# Patient Record
Sex: FEMALE | Race: OTHER | HISPANIC OR LATINO | URBAN - METROPOLITAN AREA
[De-identification: names, ages, dates, MRNs, and addresses within clinical notes are randomized per-mention and may not be internally consistent; named-entity substitution may affect disease eponyms.]

---

## 2019-08-23 ENCOUNTER — EMERGENCY (EMERGENCY)
Facility: HOSPITAL | Age: 29
LOS: 0 days | Discharge: HOME | End: 2019-08-23
Admitting: EMERGENCY MEDICINE
Payer: MEDICAID

## 2019-08-23 VITALS
TEMPERATURE: 98 F | HEART RATE: 90 BPM | RESPIRATION RATE: 18 BRPM | SYSTOLIC BLOOD PRESSURE: 145 MMHG | DIASTOLIC BLOOD PRESSURE: 67 MMHG | OXYGEN SATURATION: 100 %

## 2019-08-23 DIAGNOSIS — K08.89 OTHER SPECIFIED DISORDERS OF TEETH AND SUPPORTING STRUCTURES: ICD-10-CM

## 2019-08-23 PROCEDURE — 99283 EMERGENCY DEPT VISIT LOW MDM: CPT

## 2019-08-23 RX ORDER — IBUPROFEN 200 MG
600 TABLET ORAL ONCE
Refills: 0 | Status: COMPLETED | OUTPATIENT
Start: 2019-08-23 | End: 2019-08-23

## 2019-08-23 RX ADMIN — Medication 600 MILLIGRAM(S): at 13:57

## 2019-08-23 NOTE — ED PROVIDER NOTE - OBJECTIVE STATEMENT
29 year old female with no pmxh presents with dental pain to left side. pt admits went to dentist, and was told she needed an extraction. no swelling or fever.

## 2019-08-23 NOTE — ED PROVIDER NOTE - NS ED ROS FT
Review of Systems:  	•	CONSTITUTIONAL - no fever, no diaphoresis, no chills  	•	SKIN - no rash  	•	EYES - no eye pain, no blurry vision  	•	ENT - no change in hearing, no sore throat, no ear pain or tinnitus

## 2019-08-23 NOTE — ED PROVIDER NOTE - PHYSICAL EXAMINATION
CONST: Well appearing in NAD  EYES: PERRL, EOMI, Sclera and conjunctiva clear.   ENT: + tenderness to tooth 19 with avulsion, No nasal discharge. TM's clear B/L without drainage. Oropharynx normal appearing, no erythema or exudates. No abscess or swelling. Uvula midline.  SKIN: Warm, dry, no acute rashes. Good turgor

## 2019-08-23 NOTE — CONSULT NOTE ADULT - ASSESSMENT
*ASSESSMENT: #18 is recommended for extraction. #30 needs to be extracted as well.     Patient requested to have #18 extracted today and will follow up with her dentist/oral surgeon to extract #30.     1. Reviewed Medical History: No changes   2. Premedication: None  3. Treatment:  - Risks and benefits discussed as per oral surgey sheet dated 7/13/2000. Consent and side site were signed. Administered 1 carpule 3% Mepivacaine via left inferior alveolar nerve block. 1 carpule of 4% Septocaine, 1:100,000 epinephrine via buccal and lingual infiltration.   - Simple extraction number #18 completed, without complications.  Hemostasis obtained.   - Post op radiograph taken. Findings were negative.   4. No complications  5. Next visit: follow-up with dentist or [facility] for extraction of #30      RECOMMENDATIONS:  1) Follow Post-Op instruction sheet  2) Dental F/U with outpatient dentist for comprehensive dental care and extraction of #30.   3) If any difficulty swallowing/breathing, fever occur, return to ER.     Jorge Kaur

## 2019-08-23 NOTE — CONSULT NOTE ADULT - SUBJECTIVE AND OBJECTIVE BOX
Patient is a 29y old  Female who presents with a chief complaint of dental pain on lower left and right5    HPI: Tooth #18 and 30 have been decayed and fractured over 6 months ago. Patient is seeing an outside dentist who recommended she see an oral surgeon to have them extracted. She went to urgent care yesterday and was placed on Clindamycin and given Naproxen for pain. Patient reports she has not started taking either       PAST MEDICAL & SURGICAL HISTORY:  No pertinent past medical history    ( -  ) heart valve replacement  ( -  ) joint replacement  ( -  ) pregnancy    MEDICATIONS  (STANDING): None reported  MEDICATIONS  (PRN): None reported  Allergies: No Known Allergies    Intolerances    FAMILY HISTORY: None reported      *SOCIAL HISTORY: ( -  ) Tobacco; ( -  ) ETOH    *Last Dental Visit: 2 months ago with general dentist    Vital Signs Last 24 Hrs  T(C): 36.7 (23 Aug 2019 13:32), Max: 36.7 (23 Aug 2019 13:32)  T(F): 98.1 (23 Aug 2019 13:32), Max: 98.1 (23 Aug 2019 13:32)  HR: 90 (23 Aug 2019 13:32) (90 - 90)  BP: 145/67 (23 Aug 2019 13:32) (145/67 - 145/67)  BP(mean): --  RR: 18 (23 Aug 2019 13:32) (18 - 18)  SpO2: 100% (23 Aug 2019 13:32) (100% - 100%)    LABS:    EOE:  No swelling, asymmetry or lymphadenopathy    IOE:  buccal swelling noted in vestibule adjacent to #18. #18 and 30 are grossly decayed.    Radiographs: Both #18 and #30 have periapical radiolucencies. #17 is bony impacted distal to #18    RADIOLOGY & ADDITIONAL STUDIES:

## 2019-08-29 ENCOUNTER — EMERGENCY (EMERGENCY)
Facility: HOSPITAL | Age: 29
LOS: 0 days | Discharge: HOME | End: 2019-08-29
Admitting: EMERGENCY MEDICINE
Payer: MEDICAID

## 2019-08-29 VITALS
TEMPERATURE: 98 F | DIASTOLIC BLOOD PRESSURE: 64 MMHG | RESPIRATION RATE: 18 BRPM | HEART RATE: 82 BPM | OXYGEN SATURATION: 98 % | SYSTOLIC BLOOD PRESSURE: 139 MMHG

## 2019-08-29 DIAGNOSIS — K08.89 OTHER SPECIFIED DISORDERS OF TEETH AND SUPPORTING STRUCTURES: ICD-10-CM

## 2019-08-29 PROCEDURE — 99282 EMERGENCY DEPT VISIT SF MDM: CPT

## 2019-08-29 NOTE — ED ADULT TRIAGE NOTE - CHIEF COMPLAINT QUOTE
pt c/o right sided dental pain x months. Seen dentist on Monday and was told to come back if worsening pain.

## 2019-08-29 NOTE — ED PROVIDER NOTE - ENMT, MLM
Airway patent, Nasal mucosa clear. Mouth with normal mucosa. Throat has no vesicles, no oropharyngeal exudates and uvula is midline.  + tenderness along tooth # 30 without surrounding gum fluctuance

## 2019-08-29 NOTE — ED PROVIDER NOTE - OBJECTIVE STATEMENT
30 y/o F, no significant PMHx, presents to the ED with complaints of dentalgia x five days. Patient was recently evaluated in the dental clinic on 8/23 and was dx with dental fx's of teeth 18 & 30. She is s/p extraction of tooth # 18 and was told to return for extraction of tooth # 30 if pain persists. She has been taking Clindamycin as prescribed; denies associated facial swelling, odynophagia, dysphagia, fever and chills.

## 2019-08-29 NOTE — CONSULT NOTE ADULT - SUBJECTIVE AND OBJECTIVE BOX
S: 30 yo female patient presented to clinic with lower right pain.     O: No swelling observed. No fever seen.     A: Caries to the pulp on tooth #30. Discussed that patient could try to save the tooth and have a root canal done on #30 or extract the tooth. The patient did not want to go through with root canal and elected to have #30 extracted.     P: Explained risks and benefits of procedure as per OS sheet dated 07/13/2000. Consent obtained and side site completed. Anesthetized using 2 carpules 4% septocaine with 1:100,000 epinephrine and 1 carpule lidocaine with 1:100,000 epinephrine via inferior alveolar block and buccal infiltration. Elevated using elevators and delivered with cowhorn forceps. Hemostasis acquired with pressure. Curetted area. Post-op radiograph taken. Post-op instructions given.     Patient was given Clindamycin 325 mg by urgent care physician about a week ago so instructed the patient to finish the course of antibiotics. Patient was also prescribed Naproxen for pain management so instructed the patient to use Naproxen as needed for pain after the procedure. If the patient has post-op infection or dry socket, instructed the patient to return.     Fatou Hernandez DDS, [id number] (spectra number) S: 30 yo female patient presented to clinic with lower right pain.     O: No swelling observed. No fever seen.     A: Caries to the pulp on tooth #30. Discussed that patient could try to save the tooth and have a root canal done on #30 or extract the tooth. The patient did not want to go through with root canal and elected to have #30 extracted.     P: Explained risks and benefits of procedure as per OS sheet dated 07/13/2000. Consent obtained and side site completed. Anesthetized using 2 carpules 4% septocaine with 1:100,000 epinephrine and 1 carpule lidocaine with 1:100,000 epinephrine via inferior alveolar block and buccal infiltration. Elevated using elevators and delivered with cowhorn forceps. Hemostasis acquired with pressure. Curetted area. Post-op radiograph taken. Post-op instructions given.     Patient was given Clindamycin 300 mg by urgent care physician about a week ago so instructed the patient to finish the course of antibiotics. Patient was also prescribed Naproxen for pain management so instructed the patient to use Naproxen as needed for pain after the procedure. If the patient has post-op infection or dry socket, instructed the patient to return.     Fatou Hernandez DDS, [phone] (spectra number)

## 2020-06-09 NOTE — ED ADULT TRIAGE NOTE - SPO2 (%)
Patient missed appointment last week.     Spoke with patient informed her she has an appointment tomorrow, and will have to wait until then because she is over due for her follow up. Patient verbalized understanding and stated she will be in tomorrow.  
Patient will see Dr Teran tomorrow. She is out of medication- asking this be filled today? Please advise.     Medication: Diazepam    Provider: Sunday Teran MD  Preferred Contact Number: [phone] (mobile)      [facility]    Patient instructed to call pharmacy directly for future refills.      Advised patient that the nurse will call if there are questions or concerns, otherwise refill processing may take 48-72 hours.     
98

## 2022-04-25 ENCOUNTER — OUTPATIENT (OUTPATIENT)
Dept: OUTPATIENT SERVICES | Facility: HOSPITAL | Age: 32
LOS: 1 days | Discharge: HOME | End: 2022-04-25
Payer: MEDICAID

## 2022-04-25 VITALS — SYSTOLIC BLOOD PRESSURE: 128 MMHG | DIASTOLIC BLOOD PRESSURE: 72 MMHG | HEART RATE: 100 BPM | TEMPERATURE: 98 F

## 2022-04-25 PROCEDURE — 59025 FETAL NON-STRESS TEST: CPT | Mod: 26

## 2022-04-25 PROCEDURE — 76815 OB US LIMITED FETUS(S): CPT | Mod: 26

## 2022-04-25 PROCEDURE — 99215 OFFICE O/P EST HI 40 MIN: CPT | Mod: 25

## 2022-04-25 NOTE — OB PROVIDER TRIAGE NOTE - HISTORY OF PRESENT ILLNESS
33 yo  @39w0d, FREIDA 22 dated by first trimester sono, presents complaining of intermittent contractions of varying intensity that started yesterday night. Also reports some light pink spotting since yesterday night not requiring pads, now resolved. Denies leakage of fluid. Reports good fetal movement. Patient was 1cm dilated in the office last week. Gets prenatal care in [place], wishes to transfer care to [facility]. Denies complications this pregnancy. GBS negative. 31 yo  at 39w0d GA, FREIDA 22 dated by first trimester sono, presents complaining of intermittent contractions of varying intensity that started yesterday night. Also reports some light pink spotting since yesterday night not requiring pads, now resolved. Denies leakage of fluid. Reports good fetal movement. GBS negative.   Recieves prenatal care in [place], wishes to transfer care to [facility].   Denies complications this pregnancy.

## 2022-04-25 NOTE — OB RN TRIAGE NOTE - FALL HARM RISK - UNIVERSAL INTERVENTIONS
Bed in lowest position, wheels locked, appropriate side rails in place/Call bell, personal items and telephone in reach/Instruct patient to call for assistance before getting out of bed or chair/Non-slip footwear when patient is out of bed/Jal to call system/Physically safe environment - no spills, clutter or unnecessary equipment/Purposeful Proactive Rounding/Room/bathroom lighting operational, light cord in reach

## 2022-04-25 NOTE — OB PROVIDER TRIAGE NOTE - NSHPLABSRESULTS_GEN_ALL_CORE
GBS neg, O pos per patient's chart on phone    no PNC available GBS neg, O pos per electronic medical record on patient's phone    no PNC available

## 2022-04-25 NOTE — OB PROVIDER TRIAGE NOTE - NSOBPROVIDERNOTE_OBGYN_ALL_OB_FT
31 yo  @39w0d, GBS neg, with contractions, not in labor, BPP 10/10, with reassuring maternal and fetal status  -[facility] precautions reviewed  -Labor precautions given  -PO maternal hydration encouraged  -Will call patient tomorrow with appointment at [facility] to transfer care and discuss induction    Dr Ferrer and Dr Boudreaux aware A/P: 33 yo  at 39w0d GA, GBS neg, with contractions, not in labor, BPP 10/10, with reassuring maternal and fetal status  -discussed term labor precautions   -discussed fetal kick count instructions   -discussed PO maternal hydration   -Will call patient tomorrow with appointment at [facility] to transfer care    Dr Ferrer and Dr Boudreaux aware A/P: 31 yo  at 39w0d GA, GBS neg, with contractions, not in labor, BPP 10/10, with reassuring maternal and fetal status  -discussed term labor precautions   -discussed fetal kick count instructions   -discussed PO maternal hydration   -Will call patient tomorrow with appointment at [facility] to transfer care    Dr Ferrer and Dr Boudreaux aware    I was physically present for the key portions of the evaluation and management (e/m) service provided. I agree with the above history,physical and plan which I have reviewed and edited where appropriate    nst reactive  sonogram mvp >2cm    Pt presented to triage and was evaluated starting 16:49. The fetal heart rate monitor ended 17:50  I spent 60 minutes caring for the patient. It included obtaining a history, performing an examination, continuously monitoring the fetus and interpretation of the fetal heart rate strip, ordering and reviewing labs, documenting in the medical record and a discussion with the patient .

## 2022-04-25 NOTE — OB PROVIDER TRIAGE NOTE - NSHPPHYSICALEXAM_GEN_ALL_CORE
Physical exam:    Vital Signs Last 24 Hrs  T(F): 98.1 (25 Apr 2022 17:01), Max: 98.1 (25 Apr 2022 16:57)  HR: 100 (25 Apr 2022 17:01) (100 - 100)  BP: 128/72 (25 Apr 2022 17:01) (128/72 - 128/72)  RR: 18 (25 Apr 2022 17:01) (18 - 18)    Gen: NAD  Abdomen: soft, gravid, nontender, with nonpalpable contractions    EFM: 140/mod/pos acc  toco: irregular  SVE: 1/0/-3, vtx, intact  Sono: vertex, anterior placenta, MVP 6.2cm, BPP 8/8, EFW 3640g (68%) Vital Signs Last 24 Hrs  T(F): 98.1 (25 Apr 2022 17:01), Max: 98.1 (25 Apr 2022 16:57)  HR: 100 (25 Apr 2022 17:01) (100 - 100)  BP: 128/72 (25 Apr 2022 17:01) (128/72 - 128/72)  RR: 18 (25 Apr 2022 17:01) (18 - 18)    Physical exam:  Gen: NAD  Abdomen: soft, gravid, nontender, with nonpalpable contractions    EFM: 140/mod/pos acc  Farina: irregular  SVE: 1/0/-3, vtx, intact  Sono: vertex, anterior placenta, MVP 6.2cm, BPP 8/8, EFW 3640g (68%)

## 2022-04-26 PROBLEM — Z00.00 ENCOUNTER FOR PREVENTIVE HEALTH EXAMINATION: Status: ACTIVE | Noted: 2022-04-26

## 2022-04-28 ENCOUNTER — APPOINTMENT (OUTPATIENT)
Dept: OBGYN | Facility: CLINIC | Age: 32
End: 2022-04-28
Payer: MEDICAID

## 2022-04-28 ENCOUNTER — RESULT CHARGE (OUTPATIENT)
Age: 32
End: 2022-04-28

## 2022-04-28 ENCOUNTER — NON-APPOINTMENT (OUTPATIENT)
Age: 32
End: 2022-04-28

## 2022-04-28 ENCOUNTER — OUTPATIENT (OUTPATIENT)
Dept: OUTPATIENT SERVICES | Facility: HOSPITAL | Age: 32
LOS: 1 days | Discharge: HOME | End: 2022-04-28

## 2022-04-28 VITALS
SYSTOLIC BLOOD PRESSURE: 122 MMHG | HEIGHT: 62 IN | BODY MASS INDEX: 35.53 KG/M2 | WEIGHT: 193.06 LBS | DIASTOLIC BLOOD PRESSURE: 80 MMHG

## 2022-04-28 DIAGNOSIS — Z34.90 ENCOUNTER FOR SUPERVISION OF NORMAL PREGNANCY, UNSPECIFIED, UNSPECIFIED TRIMESTER: ICD-10-CM

## 2022-04-28 LAB
BILIRUB UR QL STRIP: NEGATIVE
CLARITY UR: CLEAR
COLLECTION METHOD: NORMAL
GLUCOSE UR-MCNC: NEGATIVE
HCG UR QL: NEGATIVE EU/DL
HGB UR QL STRIP.AUTO: NEGATIVE
KETONES UR-MCNC: NEGATIVE
LEUKOCYTE ESTERASE UR QL STRIP: NEGATIVE
NITRITE UR QL STRIP: NEGATIVE
PH UR STRIP: 7
PROT UR STRIP-MCNC: NEGATIVE
SP GR UR STRIP: 1.01

## 2022-04-28 PROCEDURE — 99214 OFFICE O/P EST MOD 30 MIN: CPT

## 2022-04-29 DIAGNOSIS — Z34.90 ENCOUNTER FOR SUPERVISION OF NORMAL PREGNANCY, UNSPECIFIED, UNSPECIFIED TRIMESTER: ICD-10-CM

## 2022-05-01 ENCOUNTER — INPATIENT (INPATIENT)
Facility: HOSPITAL | Age: 32
LOS: 1 days | Discharge: HOME | End: 2022-05-03
Attending: STUDENT IN AN ORGANIZED HEALTH CARE EDUCATION/TRAINING PROGRAM | Admitting: STUDENT IN AN ORGANIZED HEALTH CARE EDUCATION/TRAINING PROGRAM
Payer: MEDICAID

## 2022-05-01 VITALS
WEIGHT: 190.04 LBS | DIASTOLIC BLOOD PRESSURE: 74 MMHG | HEIGHT: 62 IN | SYSTOLIC BLOOD PRESSURE: 126 MMHG | SYSTOLIC BLOOD PRESSURE: 126 MMHG | HEART RATE: 93 BPM | DIASTOLIC BLOOD PRESSURE: 74 MMHG | RESPIRATION RATE: 17 BRPM | TEMPERATURE: 98 F | HEART RATE: 93 BPM

## 2022-05-01 LAB
APPEARANCE UR: CLEAR — SIGNIFICANT CHANGE UP
BASOPHILS # BLD AUTO: 0.07 K/UL — SIGNIFICANT CHANGE UP (ref 0–0.2)
BASOPHILS NFR BLD AUTO: 0.9 % — SIGNIFICANT CHANGE UP (ref 0–1)
BILIRUB UR-MCNC: NEGATIVE — SIGNIFICANT CHANGE UP
BLD GP AB SCN SERPL QL: SIGNIFICANT CHANGE UP
COLOR SPEC: YELLOW — SIGNIFICANT CHANGE UP
DIFF PNL FLD: NEGATIVE — SIGNIFICANT CHANGE UP
EOSINOPHIL # BLD AUTO: 0.13 K/UL — SIGNIFICANT CHANGE UP (ref 0–0.7)
EOSINOPHIL NFR BLD AUTO: 1.8 % — SIGNIFICANT CHANGE UP (ref 0–8)
GIANT PLATELETS BLD QL SMEAR: PRESENT — SIGNIFICANT CHANGE UP
GLUCOSE UR QL: NEGATIVE — SIGNIFICANT CHANGE UP
HCT VFR BLD CALC: 34.2 % — LOW (ref 37–47)
HGB BLD-MCNC: 11.6 G/DL — LOW (ref 12–16)
KETONES UR-MCNC: NEGATIVE — SIGNIFICANT CHANGE UP
LEUKOCYTE ESTERASE UR-ACNC: NEGATIVE — SIGNIFICANT CHANGE UP
LYMPHOCYTES # BLD AUTO: 1.27 K/UL — SIGNIFICANT CHANGE UP (ref 1.2–3.4)
LYMPHOCYTES # BLD AUTO: 17.1 % — LOW (ref 20.5–51.1)
MACROCYTES BLD QL: SLIGHT — SIGNIFICANT CHANGE UP
MANUAL SMEAR VERIFICATION: SIGNIFICANT CHANGE UP
MCHC RBC-ENTMCNC: 30.9 PG — SIGNIFICANT CHANGE UP (ref 27–31)
MCHC RBC-ENTMCNC: 33.9 G/DL — SIGNIFICANT CHANGE UP (ref 32–37)
MCV RBC AUTO: 91 FL — SIGNIFICANT CHANGE UP (ref 81–99)
MONOCYTES # BLD AUTO: 0.27 K/UL — SIGNIFICANT CHANGE UP (ref 0.1–0.6)
MONOCYTES NFR BLD AUTO: 3.6 % — SIGNIFICANT CHANGE UP (ref 1.7–9.3)
MYELOCYTES NFR BLD: 2.7 % — HIGH (ref 0–0)
NEUTROPHILS # BLD AUTO: 5.41 K/UL — SIGNIFICANT CHANGE UP (ref 1.4–6.5)
NEUTROPHILS NFR BLD AUTO: 72.1 % — SIGNIFICANT CHANGE UP (ref 42.2–75.2)
NEUTS BAND # BLD: 0.9 % — SIGNIFICANT CHANGE UP (ref 0–6)
NITRITE UR-MCNC: NEGATIVE — SIGNIFICANT CHANGE UP
PH UR: 6.5 — SIGNIFICANT CHANGE UP (ref 5–8)
PLAT MORPH BLD: NORMAL — SIGNIFICANT CHANGE UP
PLATELET # BLD AUTO: 173 K/UL — SIGNIFICANT CHANGE UP (ref 130–400)
POLYCHROMASIA BLD QL SMEAR: SLIGHT — SIGNIFICANT CHANGE UP
PRENATAL SYPHILIS TEST: SIGNIFICANT CHANGE UP
PROT UR-MCNC: SIGNIFICANT CHANGE UP
RBC # BLD: 3.76 M/UL — LOW (ref 4.2–5.4)
RBC # FLD: 12.6 % — SIGNIFICANT CHANGE UP (ref 11.5–14.5)
RBC BLD AUTO: ABNORMAL
SARS-COV-2 RNA SPEC QL NAA+PROBE: SIGNIFICANT CHANGE UP
SMUDGE CELLS # BLD: PRESENT — SIGNIFICANT CHANGE UP
SP GR SPEC: 1.02 — SIGNIFICANT CHANGE UP (ref 1.01–1.03)
UROBILINOGEN FLD QL: SIGNIFICANT CHANGE UP
VARIANT LYMPHS # BLD: 0.9 % — SIGNIFICANT CHANGE UP (ref 0–5)
WBC # BLD: 7.41 K/UL — SIGNIFICANT CHANGE UP (ref 4.8–10.8)
WBC # FLD AUTO: 7.41 K/UL — SIGNIFICANT CHANGE UP (ref 4.8–10.8)

## 2022-05-01 RX ORDER — OXYTOCIN 10 UNIT/ML
333.33 VIAL (ML) INJECTION
Qty: 20 | Refills: 0 | Status: DISCONTINUED | OUTPATIENT
Start: 2022-05-01 | End: 2022-05-02

## 2022-05-01 RX ORDER — OXYTOCIN 10 UNIT/ML
2 VIAL (ML) INJECTION
Qty: 30 | Refills: 0 | Status: DISCONTINUED | OUTPATIENT
Start: 2022-05-01 | End: 2022-05-02

## 2022-05-01 RX ORDER — SODIUM CHLORIDE 9 MG/ML
1000 INJECTION, SOLUTION INTRAVENOUS
Refills: 0 | Status: DISCONTINUED | OUTPATIENT
Start: 2022-05-01 | End: 2022-05-02

## 2022-05-01 NOTE — OB RN PATIENT PROFILE - NS_OBGYNHISTORY_OBGYN_ALL_OB_FT
Speaking Coherently
OB Hx: . FT NSVDx3, largest 7-0  GYN hx: Denies fibroids, cysts, abnormal pap smears, STIs

## 2022-05-01 NOTE — OB PROVIDER H&P - HISTORY OF PRESENT ILLNESS
33yo  at 39w6d dated by LMP presenting for scheduled IOL at term. Reports irregular contractions, denies LOF or VB. Good FM. Denies complications this pregnancy. Late transfer to care from [place]. Last baby >10yrs ago. Last checked in office 22 and found to be 2/50/-3. GBS neg (patient has records, GBS done 22)

## 2022-05-01 NOTE — OB PROVIDER H&P - PRO ANTIBODY SCREEN
Guide to Postpartum Care/Letter of Medical Neccessity/Birth Certificate Instructions/Vaccinations/[facility] Hearing Screen Program/[place]  Immunization Record/Breastfeeding Log/Back To Sleep Handout/Shaken Baby Prevention Handout/Breastfeeding Guide and Packet/[facility] [place] Screening Program negative

## 2022-05-01 NOTE — OB PROVIDER H&P - NS_OBGYNHISTORY_OBGYN_ALL_OB_FT
OB: FT NSVDx3 largest 7lb~oz  GYB; denies history of fibroids, ovarian cysts, abnormal papsmears, and STIs

## 2022-05-01 NOTE — OB PROVIDER H&P - ASSESSMENT
32y  at 39w6d, GBS neg, IOL at term.  -Admit to L+D  -Monitor EFM and TOCO   -IVF and labs  -Pain control PRN  -Clear liquid diet as tolerated  -Pitocin for labor induction    Dr. Lopez and Dr. Cuellar

## 2022-05-01 NOTE — OB PROVIDER H&P - NSHPPHYSICALEXAM_GEN_ALL_CORE
T(C): 36.6 (05-01-22 @ 19:43), Max: 36.6 (05-01-22 @ 19:43)  HR: 93 (05-01-22 @ 19:43) (93 - 93)  BP: 126/74 (05-01-22 @ 19:43) (126/74 - 126/74)  RR: 17 (05-01-22 @ 19:43) (17 - 17)  SpO2: --  BMI (kg/m2): 34.7 (05-01-22 @ 19:43)    Gen: A+OX3. NAD  Abd: Soft, Nontender. Gravid. No palpable contractions  SVE: 2/50/-3    FHR:  145BPM/mod ludin/accels pos  TOCO: irregular      EFW by Le[place]: 3600

## 2022-05-01 NOTE — OB RN PATIENT PROFILE - FALL HARM RISK - UNIVERSAL INTERVENTIONS
Bed in lowest position, wheels locked, appropriate side rails in place/Call bell, personal items and telephone in reach/Instruct patient to call for assistance before getting out of bed or chair/Non-slip footwear when patient is out of bed/[place] to call system/Physically safe environment - no spills, clutter or unnecessary equipment/Purposeful Proactive Rounding/Room/bathroom lighting operational, light cord in reach

## 2022-05-02 LAB
ABO + RH PNL BLD: NORMAL
AMPHET UR-MCNC: NEGATIVE — SIGNIFICANT CHANGE UP
BARBITURATES UR SCN-MCNC: NEGATIVE — SIGNIFICANT CHANGE UP
BASOPHILS # BLD AUTO: 0.02 K/UL — SIGNIFICANT CHANGE UP (ref 0–0.2)
BASOPHILS # BLD AUTO: 0.05 K/UL
BASOPHILS NFR BLD AUTO: 0.2 % — SIGNIFICANT CHANGE UP (ref 0–1)
BASOPHILS NFR BLD AUTO: 0.7 %
BENZODIAZ UR-MCNC: NEGATIVE — SIGNIFICANT CHANGE UP
BLD GP AB SCN SERPL QL: NORMAL
BUPRENORPHINE SCREEN, URINE RESULT: NEGATIVE — SIGNIFICANT CHANGE UP
COCAINE METAB.OTHER UR-MCNC: NEGATIVE — SIGNIFICANT CHANGE UP
EOSINOPHIL # BLD AUTO: 0.08 K/UL — SIGNIFICANT CHANGE UP (ref 0–0.7)
EOSINOPHIL # BLD AUTO: 0.1 K/UL
EOSINOPHIL NFR BLD AUTO: 0.8 % — SIGNIFICANT CHANGE UP (ref 0–8)
EOSINOPHIL NFR BLD AUTO: 1.3 %
FENTANYL UR QL: NEGATIVE — SIGNIFICANT CHANGE UP
HBV SURFACE AG SER QL: NONREACTIVE
HCT VFR BLD CALC: 30.3 % — LOW (ref 37–47)
HCT VFR BLD CALC: 36.6 %
HCV AB SER QL: NONREACTIVE
HCV S/CO RATIO: 0.08 S/CO
HGB BLD-MCNC: 10.2 G/DL — LOW (ref 12–16)
HGB BLD-MCNC: 11.7 G/DL
HIV1+2 AB SPEC QL IA.RAPID: NONREACTIVE
IMM GRANULOCYTES NFR BLD AUTO: 1.1 % — HIGH (ref 0.1–0.3)
IMM GRANULOCYTES NFR BLD AUTO: 2 %
L&D DRUG SCREEN, URINE: SIGNIFICANT CHANGE UP
LYMPHOCYTES # BLD AUTO: 1.64 K/UL — SIGNIFICANT CHANGE UP (ref 1.2–3.4)
LYMPHOCYTES # BLD AUTO: 1.75 K/UL
LYMPHOCYTES # BLD AUTO: 16.2 % — LOW (ref 20.5–51.1)
LYMPHOCYTES NFR BLD AUTO: 23.5 %
MAN DIFF?: NORMAL
MCHC RBC-ENTMCNC: 29.5 PG
MCHC RBC-ENTMCNC: 30.9 PG — SIGNIFICANT CHANGE UP (ref 27–31)
MCHC RBC-ENTMCNC: 32 G/DL
MCHC RBC-ENTMCNC: 33.7 G/DL — SIGNIFICANT CHANGE UP (ref 32–37)
MCV RBC AUTO: 91.8 FL — SIGNIFICANT CHANGE UP (ref 81–99)
MCV RBC AUTO: 92.4 FL
METHADONE UR-MCNC: NEGATIVE — SIGNIFICANT CHANGE UP
MONOCYTES # BLD AUTO: 0.59 K/UL
MONOCYTES # BLD AUTO: 0.64 K/UL — HIGH (ref 0.1–0.6)
MONOCYTES NFR BLD AUTO: 6.3 % — SIGNIFICANT CHANGE UP (ref 1.7–9.3)
MONOCYTES NFR BLD AUTO: 7.9 %
NEUTROPHILS # BLD AUTO: 4.81 K/UL
NEUTROPHILS # BLD AUTO: 7.63 K/UL — HIGH (ref 1.4–6.5)
NEUTROPHILS NFR BLD AUTO: 64.6 %
NEUTROPHILS NFR BLD AUTO: 75.4 % — HIGH (ref 42.2–75.2)
NRBC # BLD: 0 /100 WBCS — SIGNIFICANT CHANGE UP (ref 0–0)
OPIATES UR-MCNC: NEGATIVE — SIGNIFICANT CHANGE UP
OXYCODONE UR-MCNC: NEGATIVE — SIGNIFICANT CHANGE UP
PCP UR-MCNC: NEGATIVE — SIGNIFICANT CHANGE UP
PLATELET # BLD AUTO: 140 K/UL — SIGNIFICANT CHANGE UP (ref 130–400)
PLATELET # BLD AUTO: 172 K/UL
PROPOXYPHENE QUALITATIVE URINE RESULT: NEGATIVE — SIGNIFICANT CHANGE UP
RBC # BLD: 3.3 M/UL — LOW (ref 4.2–5.4)
RBC # BLD: 3.96 M/UL
RBC # FLD: 12.4 %
RBC # FLD: 12.7 % — SIGNIFICANT CHANGE UP (ref 11.5–14.5)
RUBV IGG FLD-ACNC: 4.7 INDEX
RUBV IGG SER-IMP: POSITIVE
T PALLIDUM AB SER QL IA: NEGATIVE
VZV AB TITR SER: POSITIVE
VZV IGG SER IF-ACNC: 187.1 INDEX
WBC # BLD: 10.12 K/UL — SIGNIFICANT CHANGE UP (ref 4.8–10.8)
WBC # FLD AUTO: 10.12 K/UL — SIGNIFICANT CHANGE UP (ref 4.8–10.8)
WBC # FLD AUTO: 7.45 K/UL

## 2022-05-02 PROCEDURE — 59409 OBSTETRICAL CARE: CPT | Mod: U9

## 2022-05-02 RX ORDER — SODIUM CHLORIDE 9 MG/ML
3 INJECTION INTRAMUSCULAR; INTRAVENOUS; SUBCUTANEOUS EVERY 8 HOURS
Refills: 0 | Status: DISCONTINUED | OUTPATIENT
Start: 2022-05-02 | End: 2022-05-03

## 2022-05-02 RX ORDER — ONDANSETRON 8 MG/1
4 TABLET, FILM COATED ORAL EVERY 6 HOURS
Refills: 0 | Status: DISCONTINUED | OUTPATIENT
Start: 2022-05-02 | End: 2022-05-03

## 2022-05-02 RX ORDER — NALOXONE HYDROCHLORIDE 4 MG/.1ML
0.1 SPRAY NASAL
Refills: 0 | Status: DISCONTINUED | OUTPATIENT
Start: 2022-05-02 | End: 2022-05-03

## 2022-05-02 RX ORDER — PRAMOXINE HYDROCHLORIDE 150 MG/15G
1 AEROSOL, FOAM RECTAL EVERY 4 HOURS
Refills: 0 | Status: DISCONTINUED | OUTPATIENT
Start: 2022-05-02 | End: 2022-05-03

## 2022-05-02 RX ORDER — OXYCODONE HYDROCHLORIDE 5 MG/1
5 TABLET ORAL ONCE
Refills: 0 | Status: DISCONTINUED | OUTPATIENT
Start: 2022-05-02 | End: 2022-05-03

## 2022-05-02 RX ORDER — OXYTOCIN 10 UNIT/ML
333.33 VIAL (ML) INJECTION
Qty: 20 | Refills: 0 | Status: DISCONTINUED | OUTPATIENT
Start: 2022-05-02 | End: 2022-05-03

## 2022-05-02 RX ORDER — BENZOCAINE 10 %
1 GEL (GRAM) MUCOUS MEMBRANE EVERY 6 HOURS
Refills: 0 | Status: DISCONTINUED | OUTPATIENT
Start: 2022-05-02 | End: 2022-05-03

## 2022-05-02 RX ORDER — FENTANYL/BUPIVACAINE/NS/PF 2MCG/ML-.1
250 PLASTIC BAG, INJECTION (ML) INJECTION
Refills: 0 | Status: DISCONTINUED | OUTPATIENT
Start: 2022-05-02 | End: 2022-05-02

## 2022-05-02 RX ORDER — LANOLIN
1 OINTMENT (GRAM) TOPICAL EVERY 6 HOURS
Refills: 0 | Status: DISCONTINUED | OUTPATIENT
Start: 2022-05-02 | End: 2022-05-03

## 2022-05-02 RX ORDER — TETANUS TOXOID, REDUCED DIPHTHERIA TOXOID AND ACELLULAR PERTUSSIS VACCINE, ADSORBED 5; 2.5; 8; 8; 2.5 [IU]/.5ML; [IU]/.5ML; UG/.5ML; UG/.5ML; UG/.5ML
0.5 SUSPENSION INTRAMUSCULAR ONCE
Refills: 0 | Status: DISCONTINUED | OUTPATIENT
Start: 2022-05-02 | End: 2022-05-03

## 2022-05-02 RX ORDER — DIPHENHYDRAMINE HCL 50 MG
25 CAPSULE ORAL EVERY 6 HOURS
Refills: 0 | Status: DISCONTINUED | OUTPATIENT
Start: 2022-05-02 | End: 2022-05-03

## 2022-05-02 RX ORDER — SIMETHICONE 80 MG/1
80 TABLET, CHEWABLE ORAL EVERY 4 HOURS
Refills: 0 | Status: DISCONTINUED | OUTPATIENT
Start: 2022-05-02 | End: 2022-05-03

## 2022-05-02 RX ORDER — DIPHENHYDRAMINE HCL 50 MG
25 CAPSULE ORAL ONCE
Refills: 0 | Status: COMPLETED | OUTPATIENT
Start: 2022-05-02 | End: 2022-05-02

## 2022-05-02 RX ORDER — AER TRAVELER 0.5 G/1
1 SOLUTION RECTAL; TOPICAL EVERY 4 HOURS
Refills: 0 | Status: DISCONTINUED | OUTPATIENT
Start: 2022-05-02 | End: 2022-05-03

## 2022-05-02 RX ORDER — OXYCODONE HYDROCHLORIDE 5 MG/1
5 TABLET ORAL
Refills: 0 | Status: DISCONTINUED | OUTPATIENT
Start: 2022-05-02 | End: 2022-05-03

## 2022-05-02 RX ORDER — DIBUCAINE 1 %
1 OINTMENT (GRAM) RECTAL EVERY 6 HOURS
Refills: 0 | Status: DISCONTINUED | OUTPATIENT
Start: 2022-05-02 | End: 2022-05-03

## 2022-05-02 RX ORDER — IBUPROFEN 200 MG
600 TABLET ORAL EVERY 6 HOURS
Refills: 0 | Status: DISCONTINUED | OUTPATIENT
Start: 2022-05-02 | End: 2022-05-03

## 2022-05-02 RX ORDER — BUTORPHANOL TARTRATE 2 MG/ML
2 INJECTION, SOLUTION INTRAMUSCULAR; INTRAVENOUS ONCE
Refills: 0 | Status: DISCONTINUED | OUTPATIENT
Start: 2022-05-02 | End: 2022-05-02

## 2022-05-02 RX ORDER — HYDROCORTISONE 1 %
1 OINTMENT (GRAM) TOPICAL EVERY 6 HOURS
Refills: 0 | Status: DISCONTINUED | OUTPATIENT
Start: 2022-05-02 | End: 2022-05-03

## 2022-05-02 RX ORDER — KETOROLAC TROMETHAMINE 30 MG/ML
30 SYRINGE (ML) INJECTION ONCE
Refills: 0 | Status: DISCONTINUED | OUTPATIENT
Start: 2022-05-02 | End: 2022-05-02

## 2022-05-02 RX ORDER — MAGNESIUM HYDROXIDE 400 MG/1
30 TABLET, CHEWABLE ORAL
Refills: 0 | Status: DISCONTINUED | OUTPATIENT
Start: 2022-05-02 | End: 2022-05-03

## 2022-05-02 RX ORDER — ACETAMINOPHEN 500 MG
975 TABLET ORAL
Refills: 0 | Status: DISCONTINUED | OUTPATIENT
Start: 2022-05-02 | End: 2022-05-03

## 2022-05-02 RX ORDER — IBUPROFEN 200 MG
600 TABLET ORAL EVERY 6 HOURS
Refills: 0 | Status: COMPLETED | OUTPATIENT
Start: 2022-05-02 | End: 2023-03-31

## 2022-05-02 RX ORDER — DEXAMETHASONE 0.5 MG/5ML
4 ELIXIR ORAL EVERY 6 HOURS
Refills: 0 | Status: DISCONTINUED | OUTPATIENT
Start: 2022-05-02 | End: 2022-05-03

## 2022-05-02 RX ADMIN — Medication 600 MILLIGRAM(S): at 13:00

## 2022-05-02 RX ADMIN — Medication 975 MILLIGRAM(S): at 09:12

## 2022-05-02 RX ADMIN — Medication 0.2 MILLIGRAM(S): at 05:44

## 2022-05-02 RX ADMIN — Medication 1 TABLET(S): at 12:29

## 2022-05-02 RX ADMIN — Medication 975 MILLIGRAM(S): at 15:19

## 2022-05-02 RX ADMIN — Medication 975 MILLIGRAM(S): at 15:50

## 2022-05-02 RX ADMIN — Medication 975 MILLIGRAM(S): at 22:27

## 2022-05-02 RX ADMIN — SODIUM CHLORIDE 3 MILLILITER(S): 9 INJECTION INTRAMUSCULAR; INTRAVENOUS; SUBCUTANEOUS at 13:11

## 2022-05-02 RX ADMIN — Medication 25 MILLIGRAM(S): at 02:58

## 2022-05-02 RX ADMIN — Medication 600 MILLIGRAM(S): at 12:29

## 2022-05-02 RX ADMIN — BUTORPHANOL TARTRATE 2 MILLIGRAM(S): 2 INJECTION, SOLUTION INTRAMUSCULAR; INTRAVENOUS at 02:58

## 2022-05-02 RX ADMIN — Medication 975 MILLIGRAM(S): at 21:23

## 2022-05-02 RX ADMIN — Medication 600 MILLIGRAM(S): at 23:26

## 2022-05-02 RX ADMIN — SODIUM CHLORIDE 3 MILLILITER(S): 9 INJECTION INTRAMUSCULAR; INTRAVENOUS; SUBCUTANEOUS at 21:24

## 2022-05-02 RX ADMIN — Medication 975 MILLIGRAM(S): at 08:42

## 2022-05-02 RX ADMIN — Medication 600 MILLIGRAM(S): at 18:00

## 2022-05-02 RX ADMIN — BUTORPHANOL TARTRATE 2 MILLIGRAM(S): 2 INJECTION, SOLUTION INTRAMUSCULAR; INTRAVENOUS at 03:28

## 2022-05-02 RX ADMIN — SODIUM CHLORIDE 125 MILLILITER(S): 9 INJECTION, SOLUTION INTRAVENOUS at 02:53

## 2022-05-02 RX ADMIN — Medication 600 MILLIGRAM(S): at 17:30

## 2022-05-02 RX ADMIN — Medication 30 MILLIGRAM(S): at 06:30

## 2022-05-02 NOTE — PROCEDURE NOTE - ADDITIONAL PROCEDURE DETAILS
Pt started on epidural infusion of 0.0625% bupivacaine with 2 mcg/ml fentanyl at 10 ml/hr with PCEA settings of 5 ml demand dose, 15 minute lockout, 30 ml 4 hr limit.  's, maternal VSS Pt started on epidural infusion of 0.0625% bupivacaine with 2 mcg/ml fentanyl at 10 ml/hr with PCEA settings of 5 ml demand dose, 15 minute lockout, 30 ml 4 hr limit.  's, maternal VSS    Post Labor  Epidural/ Delivery  Evaluation Note:    Uncomplicated anesthetic for Vaginal Delivery.    Patient seen at bedside. Epidural to be removed by RN before patient transfer.  Patient moving B/L lower extremities.  Motor block appropriate and resolving. Vital Signs are stable. Pain well controlled.     Harry Score greater than 9    Mental Status:  __x__ Awake   ___x__ Alert   _____ Drowsy   _____ Sedated    Nausea/Vomiting:  __x__ NO  ______Yes,   See Post - Op Orders          Pain Scale (0-10):  _____    Treatment: __X__ None       Plan: Discharge:   ____Home       __X___Floor     _____Critical Care    _____

## 2022-05-02 NOTE — OB RN DELIVERY SUMMARY - NSSELHIDDEN_OBGYN_ALL_OB_FT
[NS_DeliveryAttending2_OBGYN_ALL_OB_FT:[email or web address]=],[NS_DeliveryAttending1_OBGYN_ALL_OB_FT:[email or web address]=],[NS_DeliveryAssist1_OBGYN_ALL_OB_FT:[id number]=]

## 2022-05-02 NOTE — OB PROVIDER DELIVERY SUMMARY - NSPROVIDERDELIVERYNOTE_OBGYN_ALL_OB_FT
Patient was fully dilated and pushed. Fetal head delivered OA, and restituted to ROT. The anterior and  posterior shoulders delivered, followed by the remaining body atraumatically. Delayed cord clamping was performed for 1 minute, and then was clamped and cut. Cord blood & gases collected. The  was handed to mother for skin to skin. The placenta delivered spontaneously intact with 3v cord. Uterus massaged and firm with oxytocin given IV, patient stable. Cervix, vagina and perineum inspected. No lacerations noted.  Patient noted to have boggy lower uterine segment.  Straight cath placed, 100cc of clear urine drained.  Methergine IM x1 given.  Uterus firm and hemostasis assured.  live female, APGARS 9/9 delivered. EBL -300, hemostasis assured, uterus firm, patient in stable condition.

## 2022-05-02 NOTE — OB PROVIDER DELIVERY SUMMARY - NSSELHIDDEN_OBGYN_ALL_OB_FT
[NS_DeliveryAttending2_OBGYN_ALL_OB_FT:[email or web address]=],[NS_DeliveryAttending1_OBGYN_ALL_OB_FT:[email or web address]=],[NS_DeliveryAssist1_OBGYN_ALL_OB_FT:[id number]=]

## 2022-05-02 NOTE — CHART NOTE - NSCHARTNOTEFT_GEN_A_CORE
PGY1 Note    Patient seen at bedside complaining of increased pain and pressure s/p AROM, requesting IV stadol for pain relief. Patient checked and found to be 4/70/-2. Stadol and benadryl to be given.    -c/w expectant management  -pitocin for IOL, currently 4 mU/min  -EFM/TOCO    Dr. Lopez and Dr. Cuellar aware

## 2022-05-03 ENCOUNTER — TRANSCRIPTION ENCOUNTER (OUTPATIENT)
Age: 32
End: 2022-05-03

## 2022-05-03 VITALS
RESPIRATION RATE: 18 BRPM | TEMPERATURE: 96 F | SYSTOLIC BLOOD PRESSURE: 119 MMHG | DIASTOLIC BLOOD PRESSURE: 74 MMHG | HEART RATE: 74 BPM

## 2022-05-03 PROCEDURE — 99238 HOSP IP/OBS DSCHRG MGMT 30/<: CPT

## 2022-05-03 RX ORDER — ACETAMINOPHEN 500 MG
3 TABLET ORAL
Qty: 0 | Refills: 0 | DISCHARGE
Start: 2022-05-03

## 2022-05-03 RX ORDER — AER TRAVELER 0.5 G/1
1 SOLUTION RECTAL; TOPICAL
Qty: 0 | Refills: 0 | DISCHARGE
Start: 2022-05-03

## 2022-05-03 RX ORDER — IBUPROFEN 200 MG
1 TABLET ORAL
Qty: 0 | Refills: 0 | DISCHARGE
Start: 2022-05-03

## 2022-05-03 RX ORDER — BENZOCAINE 10 %
1 GEL (GRAM) MUCOUS MEMBRANE
Qty: 0 | Refills: 0 | DISCHARGE
Start: 2022-05-03

## 2022-05-03 RX ADMIN — Medication 600 MILLIGRAM(S): at 14:33

## 2022-05-03 RX ADMIN — Medication 600 MILLIGRAM(S): at 06:06

## 2022-05-03 RX ADMIN — SODIUM CHLORIDE 3 MILLILITER(S): 9 INJECTION INTRAMUSCULAR; INTRAVENOUS; SUBCUTANEOUS at 06:06

## 2022-05-03 RX ADMIN — Medication 600 MILLIGRAM(S): at 07:06

## 2022-05-03 RX ADMIN — Medication 975 MILLIGRAM(S): at 10:33

## 2022-05-03 RX ADMIN — Medication 975 MILLIGRAM(S): at 11:46

## 2022-05-03 RX ADMIN — Medication 1 TABLET(S): at 13:28

## 2022-05-03 RX ADMIN — Medication 600 MILLIGRAM(S): at 00:41

## 2022-05-03 RX ADMIN — Medication 600 MILLIGRAM(S): at 13:27

## 2022-05-03 NOTE — DISCHARGE NOTE OB - NS MD DC FALL RISK RISK
For information on Fall & Injury Prevention, visit: [email or web address] OR  [email or web address] OR  https://www.cdc.gov/steadi/patient.html

## 2022-05-03 NOTE — PROGRESS NOTE ADULT - SUBJECTIVE AND OBJECTIVE BOX
PGY 3 Note    Patient seen at bedside for evaluation of labor progression.  Patient reports constant rectal pressure.  Continues to feel pressure s/p epidural.  No other complaints.    Vital Signs Last 24 Hrs  T(C): 36.6 (02 May 2022 00:19), Max: 36.6 (01 May 2022 19:43)  T(F): 97.88 (02 May 2022 00:19), Max: 97.9 (01 May 2022 19:43)  HR: 89 (02 May 2022 05:08) (69 - 113)  BP: 128/57 (02 May 2022 05:08) (105/52 - 131/71)  RR: 17 (01 May 2022 19:43) (17 - 17)  SpO2: 99% (02 May 2022 05:02) (98% - 99%)      EFM: 150/mod/no accel/intermittent variable decelerations s/p epidural, cat II  TOCO: q2-3m  SVE: 8/100/+1 @0511    Labs:                        11.6   7.41  )-----------( 173      ( 01 May 2022 21:11 )             34.2           ABO RH Interpretation: O POS (22 @ 21:11)    Urinalysis Basic - ( 01 May 2022 21:11 )    Color: Yellow / Appearance: Clear / S.022 / pH: x  Gluc: x / Ketone: Negative  / Bili: Negative / Urobili: <2 mg/dL   Blood: x / Protein: Trace / Nitrite: Negative   Leuk Esterase: Negative / RBC: x / WBC x   Sq Epi: x / Non Sq Epi: x / Bacteria: x    RPR NR  O pos, antibody neg  COVID-19 neg    Meds: lactated ringers. 1000 milliLiter(s) IV Continuous <Continuous>  oxytocin Infusion 333.333 milliUNIT(s)/Min IV Continuous <Continuous>  oxytocin Infusion. 2 milliUNIT(s)/Min IV Continuous <Continuous>, started @2143, now at 6mu/min  butorphanol Injectable 2 milliGRAM(s) IV Push once, @0258  diphenhydrAMINE Injectable 25 milliGRAM(s) IV Push once  epidural @0452      
PGY1 Note    Patient seen and examined. Pain well controlled at this time. No complaints at this time. Denies fever, chills, nausea, vomiting, chest pain, shortness of breath, severe abdominal pain, heavy vaginal bleeding. Patient is ambulating, tolerating PO, and voiding without difficulty.     Physical Exam  Vital Signs Last 24 Hrs  T(C): 36 (02 May 2022 23:41), Max: 36.8 (02 May 2022 06:53)  T(F): 96.8 (02 May 2022 23:41), Max: 98.2 (02 May 2022 06:53)  HR: 77 (02 May 2022 23:41) (73 - 94)  BP: 102/59 (02 May 2022 23:41) (102/59 - 129/58)  RR: 18 (02 May 2022 23:41) (18 - 18)  Gen: AAOx3, NAD  Cardio: RRR, S1S2, no M/R/G  Resp: CTAB  Ext: No calf tenderness, no swelling  Fundus: firm, below umbilicus. Nontender.   Abd: Soft, nontender, nondistended, BS+  Lochia: minimal lochia      Labs:                        10.2   10.12 )-----------( 140      ( 02 May 2022 18:20 )             30.3                         11.6   7.41  )-----------( 173      ( 01 May 2022 21:11 )             34.2      MEDICATIONS  (STANDING):  acetaminophen     Tablet .. 975 milliGRAM(s) Oral <User Schedule>  diphtheria/tetanus/pertussis (acellular) Vaccine (ADAcel) 0.5 milliLiter(s) IntraMuscular once  fentanyl (2 MICROgram(s)/mL) + bupivacaine 0.0625%  in 0.9% Sodium Chloride PCEA 250 milliLiter(s) Epidural PCA Continuous  ibuprofen  Tablet. 600 milliGRAM(s) Oral every 6 hours  oxytocin Infusion 333.333 milliUNIT(s)/Min (1000 mL/Hr) IV Continuous <Continuous>  prenatal multivitamin 1 Tablet(s) Oral daily  sodium chloride 0.9% lock flush 3 milliLiter(s) IV Push every 8 hours    MEDICATIONS  (PRN):  benzocaine 20%/menthol 0.5% Spray 1 Spray(s) Topical every 6 hours PRN for Perineal discomfort  dexAMETHasone  Injectable 4 milliGRAM(s) IV Push every 6 hours PRN Nausea  dibucaine 1% Ointment 1 Application(s) Topical every 6 hours PRN Perineal discomfort  diphenhydrAMINE 25 milliGRAM(s) Oral every 6 hours PRN Pruritus  hydrocortisone 1% Cream 1 Application(s) Topical every 6 hours PRN Moderate Pain (4-6)  lanolin Ointment 1 Application(s) Topical every 6 hours PRN nipple soreness  magnesium hydroxide Suspension 30 milliLiter(s) Oral two times a day PRN Constipation  naloxone Injectable 0.1 milliGRAM(s) IV Push every 3 minutes PRN For ANY of the following changes in patient status:  A. RR LESS THAN 10 breaths per minute, B. Oxygen saturation LESS THAN 90%, C. Sedation score of 6  ondansetron Injectable 4 milliGRAM(s) IV Push every 6 hours PRN Nausea  oxyCODONE    IR 5 milliGRAM(s) Oral every 3 hours PRN Moderate to Severe Pain (4-10)  oxyCODONE    IR 5 milliGRAM(s) Oral once PRN Moderate to Severe Pain (4-10)  pramoxine 1%/zinc 5% Cream 1 Application(s) Topical every 4 hours PRN Moderate Pain (4-6)  simethicone 80 milliGRAM(s) Chew every 4 hours PRN Gas  witch hazel Pads 1 Application(s) Topical every 4 hours PRN Perineal discomfort    
PGY2 Note    Patient seen at bedside. No complaints at the moment.    T(F): 97.9 ( @ 19:43), Max: 97.9 ( @ 19:43)  HR: 93 ( @ 19:43)  BP: 126/74 ( @ 19:43) (126/74 - 126/74)  RR: 17 ( @ 19:43)  EFM: 145bpm/moderate variability/+accelerations/occaisional variable decelerations. occaisonal periods of fetal tachycardia  TOCO: q2mins  SVE: 3/70/-3 vtx intact    Medications:  pitocin @2143      Labs:                        11.6   7.41  )-----------( 173      ( 01 May 2022 21:11 )             34.2           Prenatal Syphilis Test: Nonreact ( @ 21:11)  Antibody Screen: NEG (22 @ 21:11)    Urinalysis Basic - ( 01 May 2022 21:11 )    Color: Yellow / Appearance: Clear / S.022 / pH: x  Gluc: x / Ketone: Negative  / Bili: Negative / Urobili: <2 mg/dL   Blood: x / Protein: Trace / Nitrite: Negative   Leuk Esterase: Negative / RBC: x / WBC x   Sq Epi: x / Non Sq Epi: x / Bacteria: x          
  PGY1 Progress Note    Patient seen and examined at bedside, no current complaints. AROM performed, clear fluid.      Vital Signs Last 24 Hrs  T(C): 36.6 (02 May 2022 00:19), Max: 36.6 (01 May 2022 19:43)  T(F): 97.88 (02 May 2022 00:19), Max: 97.9 (01 May 2022 19:43)  HR: 80 (02 May 2022 01:22) (69 - 93)  BP: 112/66 (02 May 2022 01:22) (112/66 - 126/74)  RR: 17 (01 May 2022 19:43) (17 - 17)    EFM: 135BPM/mod ludin/accels pos  TOCO: q2min  SVE: 3/70/-2, AROM clear    Medications:  Pitocin @ 4 mU/min    Labs:                        11.6   7.41  )-----------( 173      ( 01 May 2022 21:11 )             34.2           ABO RH Interpretation: O POS (22 @ 21:11)    Urinalysis Basic - ( 01 May 2022 21:11 )    Color: Yellow / Appearance: Clear / S.022 / pH: x  Gluc: x / Ketone: Negative  / Bili: Negative / Urobili: <2 mg/dL   Blood: x / Protein: Trace / Nitrite: Negative   Leuk Esterase: Negative / RBC: x / WBC x   Sq Epi: x / Non Sq Epi: x / Bacteria: x

## 2022-05-03 NOTE — DISCHARGE NOTE OB - PATIENT PORTAL LINK FT
You can access the FollowMyHealth Patient Portal offered by [facility] by registering at the following website: [email or web address]. By joining [employer]’s FollowMyHealth portal, you will also be able to view your health information using other applications (apps) compatible with our system.

## 2022-05-03 NOTE — PROGRESS NOTE ADULT - ASSESSMENT
32y now P4 S/P  PPD#1, recovering well  - encourage ambulation  - PO hydration  - Continue diet as tolerated   - Monitor vitals and bleeding  - anticipate d/c home today and is instructed to follow up in 6 weeks.   - pt desires pp BS, to be scheduled at 6 week pp visit    Dr. Hedrick and OB attending to be aware
A/P:   32y  at 40w, GBS neg, IOL at term on pitocin,  - current management  - continuous toco/EFM  - pain management PRN    Dr. Lopez and Dr. Cuellar aware
A/P:   32y  at 39w6d, GBS neg, IOL at term on pitocin with category 2 tracing undergoing resusitation  -continue to resuscitate as needed  -Monitor EFM and TOCO   -IVF hydration, clear liquid diet  -Pain control PRN  -continue Pitocin 2x2  -F/u UDS    Dr. Lopez and Dr. Cuellar      
A/P:  32y  at 40w, GBS neg, IOL at term on pitocin, s/p AROM clear, s/p epidural.    -cont EFM/toco  -clear liquid diet, IVF  -cont pitocin for IOL  -pain management with epidural  -f/u UDS    Dr. Cuellar aware.

## 2022-05-03 NOTE — DISCHARGE NOTE OB - CARE PROVIDER_API CALL
Gregory Ashby)  OBN  [address] [place] [address]  Phone: [phone]  Fax: [phone]  Follow Up Time:

## 2022-05-08 DIAGNOSIS — Z3A.39 39 WEEKS GESTATION OF PREGNANCY: ICD-10-CM

## 2022-05-16 NOTE — OB PROVIDER H&P - NS_FHRDECEL_OBGYN_ALL_OB
Writer spoke with mom.  He is continuing with episodes of vomiting.  Per mom, \"The other day he was playing tennis and felt like he was going to vomit.  He didn't have the Zofran with him.  Most of the time when he has this vomiting he doesn't have time to take the Zofran.  He just vomits.  The other day when he woke up in the morning he said he felt very dizzy.  I don't know what is going on with him.\"    Mom confirmed he has been keeping a food diary.    Mom will call CJ today to see if he can come in today or tomorrow for follow up labs.  Appointment scheduled for 6/2/22 for follow up to discuss labs.      Message routed back to Dr Lenz to review.         No Decelerations

## 2022-06-16 ENCOUNTER — APPOINTMENT (OUTPATIENT)
Dept: OBGYN | Facility: CLINIC | Age: 32
End: 2022-06-16

## 2022-06-17 NOTE — DISCHARGE NOTE OB - HOSPITAL COURSE
"[facility] Medicine  Progress Note    Patient Name: Odette Hart  MRN: [id number]  Patient Class: IP- Inpatient   Admission Date: 6/8/2022  Length of Stay: 9 days  Attending Physician: Elina Moncada MD  Primary Care Provider: Braxton Barragan MD        Subjective:     Principal Problem:Acute on chronic congestive heart failure        HPI:  From H&P by Martha Hameed PA:  "Ms. Odette Hart is a 65 y.o. 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 She notes sudden onset a few days ago, with worsening tonight. She states this is worse with exertion. She also notes pain around her colostomy site over the past several days. She was seen in the ED due to this pain on 6/6/22 and was diagnosed with a UTI, and discharged on Macrobid. She states she had two episodes of emesis yesterday. She was evaluated in the ED with labs showing anemia with H&H of 10.9/33.1, ALP of 138, BNP of >4900. CXR showed prominent pulmonary vasculature, with interstitial and patchy alveolar infiltrates, and without pleural effusion. She was treated in the ED with 40 mg IV lasix. She was admitted to inpatient status."      Overview/Hospital Course:  Patient diuresed with improvement in her shortness of breath and lower extremity swelling.  Able to tolerate her diet.  She was seen by surgery and wound care ordered to evaluate the ostomy and midline incision with dehisced areas.  In addition to dehiscence the wound care nurse noted multiple pustular " lesions scattered on the patient's buttocks and abdomen that were new.  She was started on IV vancomycin on 6/15 with some improvement.  Surgery re-evaluated and recommended continued wound care and antibiotics.  As she will need daily wound care SNF was consulted.      Interval History:  She is wondering if her carvedilol should be increased as she says she feels better on a higher dose.  We discussed her low blood pressure.  She has been asymptomatic from low BP and HR is well controlled, so probably should hold off on increasing medication for now.    Review of Systems   Constitutional:  Negative for chills and fever.   Respiratory:  Negative for cough and shortness of breath.    Cardiovascular:  Negative for chest pain and palpitations.   Skin:         Painful draining lesions scattered over abdomen.     Objective:     Vital Signs (Most Recent):  Temp: 98.1 °F (36.7 °C) (06/17/22 1526)  Pulse: 76 (06/17/22 1526)  Resp: 18 (06/17/22 1526)  BP: (!) 86/45 (06/17/22 1526)  SpO2: 98 % (06/17/22 1526)   Vital Signs (24h Range):  Temp:  [97.9 °F (36.6 °C)-98.9 °F (37.2 °C)] 98.1 °F (36.7 °C)  Pulse:  [71-89] 76  Resp:  [18-19] 18  SpO2:  [96 %-98 %] 98 %  BP: (84-99)/(45-56) 86/45     Weight: 50.1 kg (110 lb 7.9 oz)  Body mass index is 19.57 kg/m².    Intake/Output Summary (Last 24 hours) at 6/17/2022 1641  Last data filed at 6/17/2022 0500  Gross per 24 hour   Intake 120 ml   Output --   Net 120 ml      Physical Exam  Cardiovascular:      Rate and Rhythm: Normal rate and regular rhythm.      Heart sounds: No murmur heard.    No gallop.   Pulmonary:      Effort: Pulmonary effort is normal.      Breath sounds: Normal breath sounds.   Abdominal:      Comments: Midline incision with areas of dehiscence.  Dressing right side of abdomen with drainage noted.  Multiple red pustules scattered over abdomen, improving.   Neurological:      Mental Status: She is alert.       Significant Labs: All pertinent labs within the past 24  hours have been reviewed.    Significant Imaging: I have reviewed all pertinent imaging results/findings within the past 24 hours.      Assessment/Plan:      * Acute on chronic congestive heart failure  Acute on chronic combines systolic and diastolic HF   Echo  5/24/2022  Summary  · The left ventricle is severely enlarged with eccentric hypertrophy and severely decreased systolic function.  · Severe left atrial enlargement.  · Grade III left ventricular diastolic dysfunction.  · Normal right ventricular size with normal right ventricular systolic function.  · Mild-to-moderate mitral regurgitation.  · Mild tricuspid regurgitation.  · There is mild pulmonary hypertension.  - CHF pathways initiated   - monitor during diuresis     -  20 mg lasix oral daily with 12.5 mg aldactone    - clinically euvolemic      Cellulitis of abdominal wall  Patient with new eruption of pustules over her abdomen.  Wound care nurse had noted coccygeal DTI on presentation as well as 2 pustules on her buttocks.  Now with new pustules on her abdomen.  Cause of this is unclear, although appears to be a Staph infection.  Started vancomycin.  Surgery following, unclear how this is related to the recent surgery/ostomy, although there is some wound dehiscence that looks unrelated to the skin infection.  Dr. Cardozo was the CRS on her case and visited her yesterday, see his note.  Having good improvement on the antibiotics with daily wound care.      Superficial dehiscence of operation wound  appr sx recs  Wound care  Had leaking ostomy at home, doing OK here  Had both a colo-vaginal and colo-vesical fistula prior to surgery, repaired.  Required RLQ drain for some time.  Recent ct abdomen 6/6 showed post operative changes without a large focal fluid collection.      Increased nutritional needs        CAD (coronary artery disease)  - continue Plavix , asa, zetia  - 1/24/2022: [facility]: Cath: LAD: Proximal stent patent. LCX: Proximal 80%. LCX: Dominant.  Moderate disease. LCX: HEVER 2.75 x 18 mm. Distal dissection. HEVER 2.75 x 22 mm.       Debility  - fall precautions   - She will need SNF due to need for daily wound care.    Anemia  - H&H appear to be at approximate baseline   - monitor       Primary hypertension  - mildly hypotensive with preserved MAP at time of admission   - home meds: carvedilol 3.125 mg BID, lasix 20 mg QD, spironolactone 12.5 mg QD  - monitor     Ischemic cardiomyopathy  - history noted   - s/p ICD placement         VTE Risk Mitigation (From admission, onward)         Ordered     enoxaparin injection 40 mg  Daily         06/08/22 1122     IP VTE HIGH RISK PATIENT  Once         06/08/22 0253     Place sequential compression device  Until discontinued         06/08/22 0253     Place sequential compression device  Until discontinued         06/08/22 0252                Discharge Planning   SIVAN:      Code Status: Full Code   Is the patient medically ready for discharge?:     Reason for patient still in hospital (select all that apply): Pending disposition  Discharge Plan A: Home Health, Home   Discharge Delays: None known at this time              Elina Lyons MD  Department of Hospital Medicine   [facility] - Med Surg ([facility])   Patient is s/p uncomplicated . She had an uncomplicated postpartum course and has met her postpartum milestones appropriately.  Vitals are stable for discharge.

## 2022-07-07 ENCOUNTER — INPATIENT (INPATIENT)
Facility: HOSPITAL | Age: 32
LOS: 1 days | Discharge: HOME | End: 2022-07-09
Attending: SURGERY

## 2022-07-07 PROCEDURE — 99285 EMERGENCY DEPT VISIT HI MDM: CPT

## 2022-07-08 ENCOUNTER — RESULT REVIEW (OUTPATIENT)
Age: 32
End: 2022-07-08

## 2022-07-08 ENCOUNTER — TRANSCRIPTION ENCOUNTER (OUTPATIENT)
Age: 32
End: 2022-07-08

## 2022-07-08 VITALS
HEART RATE: 76 BPM | WEIGHT: 169.98 LBS | RESPIRATION RATE: 18 BRPM | OXYGEN SATURATION: 98 % | TEMPERATURE: 97 F | DIASTOLIC BLOOD PRESSURE: 73 MMHG | HEIGHT: 62 IN | SYSTOLIC BLOOD PRESSURE: 128 MMHG

## 2022-07-08 LAB
ALBUMIN SERPL ELPH-MCNC: 3.3 G/DL — LOW (ref 3.5–5.2)
ALBUMIN SERPL ELPH-MCNC: 3.8 G/DL — SIGNIFICANT CHANGE UP (ref 3.5–5.2)
ALP SERPL-CCNC: 82 U/L — SIGNIFICANT CHANGE UP (ref 30–115)
ALP SERPL-CCNC: 89 U/L — SIGNIFICANT CHANGE UP (ref 30–115)
ALT FLD-CCNC: 47 U/L — HIGH (ref 0–41)
ALT FLD-CCNC: 63 U/L — HIGH (ref 0–41)
ANION GAP SERPL CALC-SCNC: 10 MMOL/L — SIGNIFICANT CHANGE UP (ref 7–14)
ANION GAP SERPL CALC-SCNC: 10 MMOL/L — SIGNIFICANT CHANGE UP (ref 7–14)
AST SERPL-CCNC: 30 U/L — SIGNIFICANT CHANGE UP (ref 0–41)
AST SERPL-CCNC: 49 U/L — HIGH (ref 0–41)
BASOPHILS # BLD AUTO: 0.01 K/UL — SIGNIFICANT CHANGE UP (ref 0–0.2)
BASOPHILS # BLD AUTO: 0.02 K/UL — SIGNIFICANT CHANGE UP (ref 0–0.2)
BASOPHILS NFR BLD AUTO: 0.2 % — SIGNIFICANT CHANGE UP (ref 0–1)
BASOPHILS NFR BLD AUTO: 0.5 % — SIGNIFICANT CHANGE UP (ref 0–1)
BILIRUB DIRECT SERPL-MCNC: <0.2 MG/DL — SIGNIFICANT CHANGE UP (ref 0–0.3)
BILIRUB INDIRECT FLD-MCNC: SIGNIFICANT CHANGE UP MG/DL (ref 0.2–1.2)
BILIRUB SERPL-MCNC: 0.3 MG/DL — SIGNIFICANT CHANGE UP (ref 0.2–1.2)
BILIRUB SERPL-MCNC: <0.2 MG/DL — SIGNIFICANT CHANGE UP (ref 0.2–1.2)
BLD GP AB SCN SERPL QL: SIGNIFICANT CHANGE UP
BUN SERPL-MCNC: 10 MG/DL — SIGNIFICANT CHANGE UP (ref 10–20)
BUN SERPL-MCNC: 13 MG/DL — SIGNIFICANT CHANGE UP (ref 10–20)
CALCIUM SERPL-MCNC: 6.4 MG/DL — LOW (ref 8.5–10.1)
CALCIUM SERPL-MCNC: 8 MG/DL — LOW (ref 8.5–10.1)
CHLORIDE SERPL-SCNC: 111 MMOL/L — HIGH (ref 98–110)
CHLORIDE SERPL-SCNC: 114 MMOL/L — HIGH (ref 98–110)
CO2 SERPL-SCNC: 17 MMOL/L — SIGNIFICANT CHANGE UP (ref 17–32)
CO2 SERPL-SCNC: 21 MMOL/L — SIGNIFICANT CHANGE UP (ref 17–32)
CREAT SERPL-MCNC: 0.5 MG/DL — LOW (ref 0.7–1.5)
CREAT SERPL-MCNC: 0.6 MG/DL — LOW (ref 0.7–1.5)
EGFR: 122 ML/MIN/1.73M2 — SIGNIFICANT CHANGE UP
EGFR: 128 ML/MIN/1.73M2 — SIGNIFICANT CHANGE UP
EOSINOPHIL # BLD AUTO: 0.11 K/UL — SIGNIFICANT CHANGE UP (ref 0–0.7)
EOSINOPHIL # BLD AUTO: 0.12 K/UL — SIGNIFICANT CHANGE UP (ref 0–0.7)
EOSINOPHIL NFR BLD AUTO: 2 % — SIGNIFICANT CHANGE UP (ref 0–8)
EOSINOPHIL NFR BLD AUTO: 2.7 % — SIGNIFICANT CHANGE UP (ref 0–8)
GLUCOSE SERPL-MCNC: 79 MG/DL — SIGNIFICANT CHANGE UP (ref 70–99)
GLUCOSE SERPL-MCNC: 92 MG/DL — SIGNIFICANT CHANGE UP (ref 70–99)
HCT VFR BLD CALC: 27.7 % — LOW (ref 37–47)
HCT VFR BLD CALC: 34.4 % — LOW (ref 37–47)
HGB BLD-MCNC: 11.1 G/DL — LOW (ref 12–16)
HGB BLD-MCNC: 9.1 G/DL — LOW (ref 12–16)
IMM GRANULOCYTES NFR BLD AUTO: 0.2 % — SIGNIFICANT CHANGE UP (ref 0.1–0.3)
IMM GRANULOCYTES NFR BLD AUTO: 0.3 % — SIGNIFICANT CHANGE UP (ref 0.1–0.3)
LACTATE SERPL-SCNC: 1 MMOL/L — SIGNIFICANT CHANGE UP (ref 0.7–2)
LIDOCAIN IGE QN: 19 U/L — SIGNIFICANT CHANGE UP (ref 7–60)
LYMPHOCYTES # BLD AUTO: 1.15 K/UL — LOW (ref 1.2–3.4)
LYMPHOCYTES # BLD AUTO: 1.8 K/UL — SIGNIFICANT CHANGE UP (ref 1.2–3.4)
LYMPHOCYTES # BLD AUTO: 28.6 % — SIGNIFICANT CHANGE UP (ref 20.5–51.1)
LYMPHOCYTES # BLD AUTO: 30.5 % — SIGNIFICANT CHANGE UP (ref 20.5–51.1)
MCHC RBC-ENTMCNC: 29.1 PG — SIGNIFICANT CHANGE UP (ref 27–31)
MCHC RBC-ENTMCNC: 29.9 PG — SIGNIFICANT CHANGE UP (ref 27–31)
MCHC RBC-ENTMCNC: 32.3 G/DL — SIGNIFICANT CHANGE UP (ref 32–37)
MCHC RBC-ENTMCNC: 32.9 G/DL — SIGNIFICANT CHANGE UP (ref 32–37)
MCV RBC AUTO: 90.3 FL — SIGNIFICANT CHANGE UP (ref 81–99)
MCV RBC AUTO: 91.1 FL — SIGNIFICANT CHANGE UP (ref 81–99)
MONOCYTES # BLD AUTO: 0.24 K/UL — SIGNIFICANT CHANGE UP (ref 0.1–0.6)
MONOCYTES # BLD AUTO: 0.42 K/UL — SIGNIFICANT CHANGE UP (ref 0.1–0.6)
MONOCYTES NFR BLD AUTO: 6 % — SIGNIFICANT CHANGE UP (ref 1.7–9.3)
MONOCYTES NFR BLD AUTO: 7.1 % — SIGNIFICANT CHANGE UP (ref 1.7–9.3)
NEUTROPHILS # BLD AUTO: 2.49 K/UL — SIGNIFICANT CHANGE UP (ref 1.4–6.5)
NEUTROPHILS # BLD AUTO: 3.53 K/UL — SIGNIFICANT CHANGE UP (ref 1.4–6.5)
NEUTROPHILS NFR BLD AUTO: 59.9 % — SIGNIFICANT CHANGE UP (ref 42.2–75.2)
NEUTROPHILS NFR BLD AUTO: 62 % — SIGNIFICANT CHANGE UP (ref 42.2–75.2)
NRBC # BLD: 0 /100 WBCS — SIGNIFICANT CHANGE UP (ref 0–0)
NRBC # BLD: 0 /100 WBCS — SIGNIFICANT CHANGE UP (ref 0–0)
PLATELET # BLD AUTO: 204 K/UL — SIGNIFICANT CHANGE UP (ref 130–400)
PLATELET # BLD AUTO: 57 K/UL — LOW (ref 130–400)
POTASSIUM SERPL-MCNC: 3.1 MMOL/L — LOW (ref 3.5–5)
POTASSIUM SERPL-MCNC: 4.5 MMOL/L — SIGNIFICANT CHANGE UP (ref 3.5–5)
POTASSIUM SERPL-SCNC: 3.1 MMOL/L — LOW (ref 3.5–5)
POTASSIUM SERPL-SCNC: 4.5 MMOL/L — SIGNIFICANT CHANGE UP (ref 3.5–5)
PROT SERPL-MCNC: 4.9 G/DL — LOW (ref 6–8)
PROT SERPL-MCNC: 5.8 G/DL — LOW (ref 6–8)
RBC # BLD: 3.04 M/UL — LOW (ref 4.2–5.4)
RBC # BLD: 3.81 M/UL — LOW (ref 4.2–5.4)
RBC # FLD: 13.2 % — SIGNIFICANT CHANGE UP (ref 11.5–14.5)
RBC # FLD: 13.3 % — SIGNIFICANT CHANGE UP (ref 11.5–14.5)
SARS-COV-2 RNA SPEC QL NAA+PROBE: SIGNIFICANT CHANGE UP
SODIUM SERPL-SCNC: 141 MMOL/L — SIGNIFICANT CHANGE UP (ref 135–146)
SODIUM SERPL-SCNC: 142 MMOL/L — SIGNIFICANT CHANGE UP (ref 135–146)
WBC # BLD: 4.02 K/UL — LOW (ref 4.8–10.8)
WBC # BLD: 5.9 K/UL — SIGNIFICANT CHANGE UP (ref 4.8–10.8)
WBC # FLD AUTO: 4.02 K/UL — LOW (ref 4.8–10.8)
WBC # FLD AUTO: 5.9 K/UL — SIGNIFICANT CHANGE UP (ref 4.8–10.8)

## 2022-07-08 PROCEDURE — 99222 1ST HOSP IP/OBS MODERATE 55: CPT | Mod: 57

## 2022-07-08 PROCEDURE — 47562 LAPAROSCOPIC CHOLECYSTECTOMY: CPT | Mod: 1L

## 2022-07-08 PROCEDURE — 76705 ECHO EXAM OF ABDOMEN: CPT | Mod: 26

## 2022-07-08 PROCEDURE — 88304 TISSUE EXAM BY PATHOLOGIST: CPT | Mod: 26

## 2022-07-08 PROCEDURE — 93010 ELECTROCARDIOGRAM REPORT: CPT

## 2022-07-08 RX ORDER — HYDROMORPHONE HYDROCHLORIDE 2 MG/ML
0.5 INJECTION INTRAMUSCULAR; INTRAVENOUS; SUBCUTANEOUS
Refills: 0 | Status: DISCONTINUED | OUTPATIENT
Start: 2022-07-08 | End: 2022-07-09

## 2022-07-08 RX ORDER — KETOROLAC TROMETHAMINE 30 MG/ML
15 SYRINGE (ML) INJECTION ONCE
Refills: 0 | Status: COMPLETED | OUTPATIENT
Start: 2022-07-08 | End: 2022-07-08

## 2022-07-08 RX ORDER — CHLORHEXIDINE GLUCONATE 213 G/1000ML
1 SOLUTION TOPICAL
Refills: 0 | Status: DISCONTINUED | OUTPATIENT
Start: 2022-07-08 | End: 2022-07-09

## 2022-07-08 RX ORDER — POTASSIUM CHLORIDE 20 MEQ
20 PACKET (EA) ORAL ONCE
Refills: 0 | Status: COMPLETED | OUTPATIENT
Start: 2022-07-08 | End: 2022-07-08

## 2022-07-08 RX ORDER — ENOXAPARIN SODIUM 100 MG/ML
40 INJECTION SUBCUTANEOUS EVERY 24 HOURS
Refills: 0 | Status: DISCONTINUED | OUTPATIENT
Start: 2022-07-08 | End: 2022-07-09

## 2022-07-08 RX ORDER — IBUPROFEN 200 MG
400 TABLET ORAL EVERY 6 HOURS
Refills: 0 | Status: DISCONTINUED | OUTPATIENT
Start: 2022-07-08 | End: 2022-07-08

## 2022-07-08 RX ORDER — CEFOTETAN DISODIUM 1 G
1 VIAL (EA) INJECTION ONCE
Refills: 0 | Status: COMPLETED | OUTPATIENT
Start: 2022-07-08 | End: 2022-07-08

## 2022-07-08 RX ORDER — SODIUM CHLORIDE 9 MG/ML
1000 INJECTION INTRAMUSCULAR; INTRAVENOUS; SUBCUTANEOUS ONCE
Refills: 0 | Status: COMPLETED | OUTPATIENT
Start: 2022-07-08 | End: 2022-07-08

## 2022-07-08 RX ORDER — SODIUM CHLORIDE 9 MG/ML
1000 INJECTION, SOLUTION INTRAVENOUS
Refills: 0 | Status: DISCONTINUED | OUTPATIENT
Start: 2022-07-08 | End: 2022-07-09

## 2022-07-08 RX ORDER — MORPHINE SULFATE 50 MG/1
4 CAPSULE, EXTENDED RELEASE ORAL ONCE
Refills: 0 | Status: DISCONTINUED | OUTPATIENT
Start: 2022-07-08 | End: 2022-07-08

## 2022-07-08 RX ORDER — CEFOTETAN DISODIUM 1 G
2 VIAL (EA) INJECTION EVERY 12 HOURS
Refills: 0 | Status: COMPLETED | OUTPATIENT
Start: 2022-07-09 | End: 2022-07-09

## 2022-07-08 RX ORDER — OXYCODONE HYDROCHLORIDE 5 MG/1
5 TABLET ORAL EVERY 6 HOURS
Refills: 0 | Status: DISCONTINUED | OUTPATIENT
Start: 2022-07-08 | End: 2022-07-09

## 2022-07-08 RX ORDER — PANTOPRAZOLE SODIUM 20 MG/1
40 TABLET, DELAYED RELEASE ORAL
Refills: 0 | Status: DISCONTINUED | OUTPATIENT
Start: 2022-07-08 | End: 2022-07-09

## 2022-07-08 RX ORDER — ACETAMINOPHEN 500 MG
650 TABLET ORAL EVERY 6 HOURS
Refills: 0 | Status: DISCONTINUED | OUTPATIENT
Start: 2022-07-08 | End: 2022-07-09

## 2022-07-08 RX ORDER — IBUPROFEN 200 MG
600 TABLET ORAL EVERY 8 HOURS
Refills: 0 | Status: DISCONTINUED | OUTPATIENT
Start: 2022-07-08 | End: 2022-07-09

## 2022-07-08 RX ORDER — ONDANSETRON 8 MG/1
4 TABLET, FILM COATED ORAL ONCE
Refills: 0 | Status: DISCONTINUED | OUTPATIENT
Start: 2022-07-08 | End: 2022-07-09

## 2022-07-08 RX ADMIN — SODIUM CHLORIDE 1000 MILLILITER(S): 9 INJECTION INTRAMUSCULAR; INTRAVENOUS; SUBCUTANEOUS at 02:26

## 2022-07-08 RX ADMIN — SODIUM CHLORIDE 75 MILLILITER(S): 9 INJECTION, SOLUTION INTRAVENOUS at 21:06

## 2022-07-08 RX ADMIN — Medication 100 GRAM(S): at 08:22

## 2022-07-08 RX ADMIN — MORPHINE SULFATE 4 MILLIGRAM(S): 50 CAPSULE, EXTENDED RELEASE ORAL at 08:25

## 2022-07-08 RX ADMIN — HYDROMORPHONE HYDROCHLORIDE 0.5 MILLIGRAM(S): 2 INJECTION INTRAMUSCULAR; INTRAVENOUS; SUBCUTANEOUS at 21:11

## 2022-07-08 RX ADMIN — Medication 600 MILLIGRAM(S): at 22:27

## 2022-07-08 RX ADMIN — Medication 600 MILLIGRAM(S): at 23:00

## 2022-07-08 RX ADMIN — Medication 50 MILLIEQUIVALENT(S): at 08:22

## 2022-07-08 RX ADMIN — HYDROMORPHONE HYDROCHLORIDE 0.5 MILLIGRAM(S): 2 INJECTION INTRAMUSCULAR; INTRAVENOUS; SUBCUTANEOUS at 20:59

## 2022-07-08 NOTE — ED PROVIDER NOTE - PROGRESS NOTE DETAILS
Dr. Guzmán: sign out to Dr. Mitchell   pending US results and UA Dr. Mitchell - patient resting comfortably pending imaging and disposition will continue management. Dr. Mitchell - Patient reassessed bedside.  Had been asleep during most of my shift but now appears uncomfortable, states her symptoms are persistent and unrelenting, with radiation to the back.  Results of imaging reviewed, concern for choledocholithiasis based on prelim read.  Call made for official read by me.  CBD is larger than expected for her age, will start antibiotics and consult surgery/GI for choledocholithiasis with intractable abdominal pain. Case signed out to Dr. Barajas at 7am for continued care.

## 2022-07-08 NOTE — ED ADULT NURSE NOTE - OBJECTIVE STATEMENT
Pt c/o of RUQ abd pain associated with nausea/vomiting and diarrhea x1 week. PT states the pain got so bad tonight and radiates to b/l flanks.

## 2022-07-08 NOTE — CHART NOTE - NSCHARTNOTEFT_GEN_A_CORE
PACU ANESTHESIA ADMISSION NOTE      Procedure: Laparoscopic cholecystectomy      Post op diagnosis:  Acute calculous cholecystitis        ____  Intubated  TV:______       Rate: ______      FiO2: ______    __x__  Patent Airway    __x__  Full return of protective reflexes    _x___  Full recovery from anesthesia / back to baseline     Vitals:   T:   98.6F        R:    18              BP:  121/73                Sat:     100              P: 60      Mental Status:  __x__ Awake   __x___ Alert   _____ Drowsy   _____ Sedated    Nausea/Vomiting:  __x__ NO  ______Yes,   See Post - Op Orders          Pain Scale (0-10):  _0/10____    Treatment: ____ None    __x__ See Post - Op/PCA Orders    Post - Operative Fluids:   ____ Oral   __x__ See Post - Op Orders    Plan: Discharge:   ____Home       __x___Floor     _____Critical Care    _____  Other:_________________    Comments: Pt tolerated procedure well, no anesthesia related complications. Care of pt endorsed to PACU, report given to PACU RN. Discharge to the next level of care when criteria are met.

## 2022-07-08 NOTE — ED ADULT TRIAGE NOTE - CHIEF COMPLAINT QUOTE
Pt presents to the ED c/o of RUQ abd pain associated with nausea/vomiting and diarrhea x1 week. PT states the pain got so bad tonight and radiates to b/l flanks.

## 2022-07-08 NOTE — ED PROVIDER NOTE - OBJECTIVE STATEMENT
32-year-old female with no past medical history presents to ED for 1 week of abdominal pain.  Patient states that the epigastric and right upper quadrant.  When she gets the pain she has some diarrhea and she also gets nauseous.  No fever no chills.  No association with food.  Patient came to the emergency room today because she felt like the pain was getting worse.  No dysuria hematuria.  No abdominal surgeries.

## 2022-07-08 NOTE — BRIEF OPERATIVE NOTE - NSICDXBRIEFOPLAUNCH_GEN_ALL_CORE
<--- Click to Launch ICDx for PreOp, PostOp and Procedure Pharmacy Consult Note: Medication Acquisition/COPD consult    Patient has medication coverage with Medicare   The following medication was evaluated and the cost for a 30-day supply is:  Tiotropium 18 mcg capsule- non covered by insurance   Umeclidinium 62.5 mcg/actuation, 8.50 $ for 30 day supply   Umeclidinium inhaler will be bedside delivered by [facility] pharmacy prior to discharge     Please let us know if you have any questions.     Thank you for the consult,  Olinda Paz  Extension [phone]     **Note: Consults are reviewed Monday-Friday 7:00am-3:30pm. THE ABOVE RECOMMENDATIONS ARE ONLY SUGGESTED.THE RECOMMENDATIONS SHOULD BE CONSIDERED IN CONJUNCTION WITH ALL PATIENT FACTORS.**

## 2022-07-08 NOTE — H&P ADULT - ASSESSMENT
Assessment  32F no significant PMH/PSH presenting with 1 week history of off/on RUQ abdominal pain radiating to the back with associated NBNB N/V, on workup patient found to have CBD of 6mm and large impacted stone in neck of gallbladder    Plan  - f/u repeat CBC/CMP  - f/u repeat PLT  - monitor LFTs  - plan for possible laparoscopic cholecystectomy this admission  - pain control  - hemodynamic monitoring  - d/w Dr. Mcbride

## 2022-07-08 NOTE — H&P ADULT - NSHPPHYSICALEXAM_GEN_ALL_CORE
PHYSICAL EXAM:  GENERAL: NAD, well-appearing  CHEST/LUNG: Clear to auscultation bilaterally  HEART: Regular rate and rhythm  ABDOMEN: Soft, RUQ tenderness, (-) Maloney, Nondistended; No rebound or guarding  EXTREMITIES:  No clubbing, cyanosis, or edema

## 2022-07-08 NOTE — ED PROVIDER NOTE - PHYSICAL EXAMINATION
Const: appears uncomfortable  Eyes: PERRL, no conjunctival injection  HENT:  Neck supple without meningismus   CV: RRR, Warm, well-perfused extremities  RESP: CTA B/L, no tachypnea   GI: soft, epigastric and RUQ tenderness, non-distended  MSK: No gross deformities appreciated  Skin: Warm, dry. No rashes  Neuro: Alert, CNs II-XII grossly intact. Sensation and motor function of extremities grossly intact.  Psych: Appropriate mood and affect.

## 2022-07-08 NOTE — ED PROVIDER NOTE - NS ED ROS FT
Review of Systems   Constitutional:  No Weight Change, No Fever, No Chills, No weakness    ENT/Mouth:  No Nasal Congestion, No Hoarseness, No sore throat, No Rhinorrhea, No Swallowing Difficulty  Eyes:  No Eye Pain, No Swelling, No Redness, No Discharge, No Vision Changes  Cardiovascular:  No Chest Pain,   Respiratory:  No SOB, No Cough,   Gastrointestinal:  + Nausea, + Diarrhea, No Constipation, + abdominal pain, No melena or bright red blood per rectum  Genitourinary:  No Dysuria, No Urinary Frequency, No Hematuria, No Urinary Incontinence,  Musculoskeletal:  No Arthralgias, No Myalgias, No Joint Swelling, No Joint Stiffness,  Skin:  No rashes

## 2022-07-08 NOTE — H&P ADULT - HISTORY OF PRESENT ILLNESS
32F no significant PMH/PSH presenting with 1 week history of off/on RUQ abdominal pain radiating to the back with associated NBNB N/V and loose BM. Patient states the pain does not get better/or worse with PO intake. Patient is having regular BM and passing gas. Never had this kind of pain before. No sick contact, no recent travel.

## 2022-07-08 NOTE — H&P ADULT - ATTENDING COMMENTS
kareem seen and examined, has biliary colic. admit to the hospital ,npo, iv fluids, will repeat labs. will need lap jack. procedure including risks and potential complications were explained to the patient, she understood and agreed.

## 2022-07-09 ENCOUNTER — TRANSCRIPTION ENCOUNTER (OUTPATIENT)
Age: 32
End: 2022-07-09

## 2022-07-09 VITALS
SYSTOLIC BLOOD PRESSURE: 110 MMHG | RESPIRATION RATE: 18 BRPM | OXYGEN SATURATION: 99 % | HEART RATE: 74 BPM | TEMPERATURE: 98 F | DIASTOLIC BLOOD PRESSURE: 62 MMHG

## 2022-07-09 LAB
ALBUMIN SERPL ELPH-MCNC: 4.2 G/DL — SIGNIFICANT CHANGE UP (ref 3.5–5.2)
ALBUMIN SERPL ELPH-MCNC: 4.2 G/DL — SIGNIFICANT CHANGE UP (ref 3.5–5.2)
ALP SERPL-CCNC: 110 U/L — SIGNIFICANT CHANGE UP (ref 30–115)
ALP SERPL-CCNC: 119 U/L — HIGH (ref 30–115)
ALT FLD-CCNC: 116 U/L — HIGH (ref 0–41)
ALT FLD-CCNC: 139 U/L — HIGH (ref 0–41)
ANION GAP SERPL CALC-SCNC: 11 MMOL/L — SIGNIFICANT CHANGE UP (ref 7–14)
AST SERPL-CCNC: 108 U/L — HIGH (ref 0–41)
AST SERPL-CCNC: 74 U/L — HIGH (ref 0–41)
BASOPHILS # BLD AUTO: 0.01 K/UL — SIGNIFICANT CHANGE UP (ref 0–0.2)
BASOPHILS NFR BLD AUTO: 0.1 % — SIGNIFICANT CHANGE UP (ref 0–1)
BILIRUB DIRECT SERPL-MCNC: <0.2 MG/DL — SIGNIFICANT CHANGE UP (ref 0–0.3)
BILIRUB DIRECT SERPL-MCNC: <0.2 MG/DL — SIGNIFICANT CHANGE UP (ref 0–0.3)
BILIRUB INDIRECT FLD-MCNC: >0.2 MG/DL — SIGNIFICANT CHANGE UP (ref 0.2–1.2)
BILIRUB INDIRECT FLD-MCNC: >0.3 MG/DL — SIGNIFICANT CHANGE UP (ref 0.2–1.2)
BILIRUB SERPL-MCNC: 0.4 MG/DL — SIGNIFICANT CHANGE UP (ref 0.2–1.2)
BILIRUB SERPL-MCNC: 0.5 MG/DL — SIGNIFICANT CHANGE UP (ref 0.2–1.2)
BUN SERPL-MCNC: 9 MG/DL — LOW (ref 10–20)
CALCIUM SERPL-MCNC: 8.9 MG/DL — SIGNIFICANT CHANGE UP (ref 8.5–10.1)
CHLORIDE SERPL-SCNC: 108 MMOL/L — SIGNIFICANT CHANGE UP (ref 98–110)
CO2 SERPL-SCNC: 22 MMOL/L — SIGNIFICANT CHANGE UP (ref 17–32)
CREAT SERPL-MCNC: 0.7 MG/DL — SIGNIFICANT CHANGE UP (ref 0.7–1.5)
EGFR: 118 ML/MIN/1.73M2 — SIGNIFICANT CHANGE UP
EOSINOPHIL # BLD AUTO: 0 K/UL — SIGNIFICANT CHANGE UP (ref 0–0.7)
EOSINOPHIL NFR BLD AUTO: 0 % — SIGNIFICANT CHANGE UP (ref 0–8)
GLUCOSE SERPL-MCNC: 130 MG/DL — HIGH (ref 70–99)
HCT VFR BLD CALC: 38.7 % — SIGNIFICANT CHANGE UP (ref 37–47)
HGB BLD-MCNC: 12.6 G/DL — SIGNIFICANT CHANGE UP (ref 12–16)
IMM GRANULOCYTES NFR BLD AUTO: 0.3 % — SIGNIFICANT CHANGE UP (ref 0.1–0.3)
LYMPHOCYTES # BLD AUTO: 0.75 K/UL — LOW (ref 1.2–3.4)
LYMPHOCYTES # BLD AUTO: 8.7 % — LOW (ref 20.5–51.1)
MAGNESIUM SERPL-MCNC: 1.8 MG/DL — SIGNIFICANT CHANGE UP (ref 1.8–2.4)
MCHC RBC-ENTMCNC: 29.4 PG — SIGNIFICANT CHANGE UP (ref 27–31)
MCHC RBC-ENTMCNC: 32.6 G/DL — SIGNIFICANT CHANGE UP (ref 32–37)
MCV RBC AUTO: 90.4 FL — SIGNIFICANT CHANGE UP (ref 81–99)
MONOCYTES # BLD AUTO: 0.15 K/UL — SIGNIFICANT CHANGE UP (ref 0.1–0.6)
MONOCYTES NFR BLD AUTO: 1.7 % — SIGNIFICANT CHANGE UP (ref 1.7–9.3)
NEUTROPHILS # BLD AUTO: 7.71 K/UL — HIGH (ref 1.4–6.5)
NEUTROPHILS NFR BLD AUTO: 89.2 % — HIGH (ref 42.2–75.2)
NRBC # BLD: 0 /100 WBCS — SIGNIFICANT CHANGE UP (ref 0–0)
PHOSPHATE SERPL-MCNC: 3.2 MG/DL — SIGNIFICANT CHANGE UP (ref 2.1–4.9)
PLATELET # BLD AUTO: 237 K/UL — SIGNIFICANT CHANGE UP (ref 130–400)
POTASSIUM SERPL-MCNC: 4.4 MMOL/L — SIGNIFICANT CHANGE UP (ref 3.5–5)
POTASSIUM SERPL-SCNC: 4.4 MMOL/L — SIGNIFICANT CHANGE UP (ref 3.5–5)
PROT SERPL-MCNC: 6.6 G/DL — SIGNIFICANT CHANGE UP (ref 6–8)
PROT SERPL-MCNC: 6.7 G/DL — SIGNIFICANT CHANGE UP (ref 6–8)
RBC # BLD: 4.28 M/UL — SIGNIFICANT CHANGE UP (ref 4.2–5.4)
RBC # FLD: 13.4 % — SIGNIFICANT CHANGE UP (ref 11.5–14.5)
SODIUM SERPL-SCNC: 141 MMOL/L — SIGNIFICANT CHANGE UP (ref 135–146)
WBC # BLD: 8.65 K/UL — SIGNIFICANT CHANGE UP (ref 4.8–10.8)
WBC # FLD AUTO: 8.65 K/UL — SIGNIFICANT CHANGE UP (ref 4.8–10.8)

## 2022-07-09 RX ORDER — MAGNESIUM SULFATE 500 MG/ML
2 VIAL (ML) INJECTION ONCE
Refills: 0 | Status: COMPLETED | OUTPATIENT
Start: 2022-07-09 | End: 2022-07-09

## 2022-07-09 RX ORDER — ACETAMINOPHEN 500 MG
1 TABLET ORAL
Qty: 14 | Refills: 0
Start: 2022-07-09 | End: 2022-07-15

## 2022-07-09 RX ORDER — ONDANSETRON 8 MG/1
4 TABLET, FILM COATED ORAL ONCE
Refills: 0 | Status: COMPLETED | OUTPATIENT
Start: 2022-07-09 | End: 2022-07-09

## 2022-07-09 RX ADMIN — Medication 100 GRAM(S): at 00:57

## 2022-07-09 RX ADMIN — Medication 650 MILLIGRAM(S): at 11:14

## 2022-07-09 RX ADMIN — Medication 600 MILLIGRAM(S): at 06:34

## 2022-07-09 RX ADMIN — Medication 100 GRAM(S): at 11:40

## 2022-07-09 RX ADMIN — OXYCODONE HYDROCHLORIDE 5 MILLIGRAM(S): 5 TABLET ORAL at 11:14

## 2022-07-09 RX ADMIN — CHLORHEXIDINE GLUCONATE 1 APPLICATION(S): 213 SOLUTION TOPICAL at 06:42

## 2022-07-09 RX ADMIN — PANTOPRAZOLE SODIUM 40 MILLIGRAM(S): 20 TABLET, DELAYED RELEASE ORAL at 06:35

## 2022-07-09 RX ADMIN — Medication 25 GRAM(S): at 02:18

## 2022-07-09 NOTE — DISCHARGE NOTE PROVIDER - NSDCCPCAREPLAN_GEN_ALL_CORE_FT
PRINCIPAL DISCHARGE DIAGNOSIS  Diagnosis: Biliary colic  Assessment and Plan of Treatment: You came to the hospital with abdominal pain and were diagnosed with acute cholecystitis. This is infection of the gallbladder.   You underwent a laparoscopic cholecystectomy (removal of gallbladder).   You may take Tylenol as needed for pain control. You may eat a regular diet but eat low fat food the first 2 weeks to avoid diarrhea.   Avoid heavy lifting over 15-20 lbs for the enxt 6 weeks.   You should follow up in the office with Dr Mcbride in 1-2 weeks.   If you experience severe pain, nausea, vomiting, fever- call office or report to nearest Emergency Department.      SECONDARY DISCHARGE DIAGNOSES  Diagnosis: Intractable abdominal pain  Assessment and Plan of Treatment:

## 2022-07-09 NOTE — DISCHARGE NOTE PROVIDER - NSDCFUSCHEDAPPT_GEN_ALL_CORE_FT
[facility]  [facility]  Scheduled Appointment: 07/21/2022

## 2022-07-09 NOTE — DISCHARGE NOTE NURSING/CASE MANAGEMENT/SOCIAL WORK - PATIENT PORTAL LINK FT
You can access the FollowMyHealth Patient Portal offered by [facility] by registering at the following website: [email or web address]. By joining [employer]’s FollowMyHealth portal, you will also be able to view your health information using other applications (apps) compatible with our system.

## 2022-07-09 NOTE — DISCHARGE NOTE PROVIDER - NSDCMRMEDTOKEN_GEN_ALL_CORE_FT
acetaminophen 325 mg oral tablet: 3 tab(s) orally every 6 hours, As Needed  acetaminophen 650 mg oral tablet, extended release: 1 tab(s) orally 2 times a day   ibuprofen 600 mg oral tablet: 1 tab(s) orally every 6 hours, As Needed  Prenatal Multivitamins with Folic Acid 1 mg oral tablet: 1 tab(s) orally once a day

## 2022-07-09 NOTE — DISCHARGE NOTE NURSING/CASE MANAGEMENT/SOCIAL WORK - NSDCPEFALRISK_GEN_ALL_CORE
For information on Fall & Injury Prevention, visit: [email or web address] OR  [email or web address] OR  https://www.cdc.gov/steadi/patient.html

## 2022-07-09 NOTE — DISCHARGE NOTE PROVIDER - CARE PROVIDER_API CALL
Jayshree Mcbride (MD)  Surgery; Surgical Critical Care  [address] [place] [id number]  Phone: [phone]  Fax: [phone]  Follow Up Time:

## 2022-07-09 NOTE — DISCHARGE NOTE PROVIDER - HOSPITAL COURSE
31 y/o F with no pmhx presented to the ED with 1 week of colicky RUQ abdominal pain, nausea and vomiting,   RUQ sono showed thickened GB wall to 3.2cm, large stone in neck of gallbladder- consistent with acute cholecystitis.   She was pre-opped and taken to the Operating room for a laparoscopic cholecystectomy. She tolerated the procedure well.   She is pain controlled, tolerating diet, voiding, ambulating, passing gas.     She will be discharged home with follow up in the office in 1 week.

## 2022-07-13 LAB — SURGICAL PATHOLOGY STUDY: SIGNIFICANT CHANGE UP

## 2022-07-20 ENCOUNTER — LABORATORY RESULT (OUTPATIENT)
Age: 32
End: 2022-07-20

## 2022-07-21 ENCOUNTER — OUTPATIENT (OUTPATIENT)
Dept: OUTPATIENT SERVICES | Facility: HOSPITAL | Age: 32
LOS: 1 days | Discharge: HOME | End: 2022-07-21

## 2022-07-21 ENCOUNTER — APPOINTMENT (OUTPATIENT)
Dept: OBGYN | Facility: CLINIC | Age: 32
End: 2022-07-21

## 2022-07-21 VITALS
SYSTOLIC BLOOD PRESSURE: 100 MMHG | WEIGHT: 175 LBS | HEIGHT: 62 IN | DIASTOLIC BLOOD PRESSURE: 60 MMHG | BODY MASS INDEX: 32.2 KG/M2

## 2022-07-21 DIAGNOSIS — Z01.419 ENCOUNTER FOR GYNECOLOGICAL EXAMINATION (GENERAL) (ROUTINE) W/OUT ABNORMAL FINDINGS: ICD-10-CM

## 2022-07-21 DIAGNOSIS — Z30.2 ENCOUNTER FOR STERILIZATION: ICD-10-CM

## 2022-07-21 PROCEDURE — 99214 OFFICE O/P EST MOD 30 MIN: CPT | Mod: 25

## 2022-07-21 PROCEDURE — 99395 PREV VISIT EST AGE 18-39: CPT

## 2022-07-21 RX ADMIN — MEDROXYPROGESTERONE ACETATE 0 MG/ML: 150 INJECTION, SUSPENSION INTRAMUSCULAR at 00:00

## 2022-07-21 NOTE — HISTORY OF PRESENT ILLNESS
[FreeTextEntry1] : 31yo P4 here for annual exam, f/u s/p , without complaints.\par Pt delivered 9 wks ago, no complications. She stopped breast feeding about 2 weeks ago when she underwent lap. cholecystectomy. Recovering well, no complaints.\par \par PMH: neg\par PSH: lap jack\par  x4\par NKDA\par no meds

## 2022-07-21 NOTE — DISCUSSION/SUMMARY
[FreeTextEntry1] : 33yo P4 for annual exam, requesting sterilization\par #annual\par -pap/hpv done\par \par #permanent sterilization\par -reviewed all contraception options with patient, including LARCs which are equally effective and safer, reversible\par -discussed that laparoscopic removal of fallopian tubes would be done, which is permanent and cannot be reversed\par -discussed that male sterilization for her partner is an option which is safer as well\par -risks of surgery include bleeding, infection, damage to bowel and other organs\par -all questions answered, scheduled for 8/25/22 and 30 day consent was signed.

## 2022-08-01 LAB
CYTOLOGY CVX/VAG DOC THIN PREP: ABNORMAL
HPV HIGH+LOW RISK DNA PNL CVX: DETECTED

## 2022-08-04 ENCOUNTER — OUTPATIENT (OUTPATIENT)
Dept: OUTPATIENT SERVICES | Facility: HOSPITAL | Age: 32
LOS: 1 days | Discharge: HOME | End: 2022-08-04

## 2022-08-04 VITALS
RESPIRATION RATE: 14 BRPM | SYSTOLIC BLOOD PRESSURE: 106 MMHG | DIASTOLIC BLOOD PRESSURE: 65 MMHG | WEIGHT: 179.9 LBS | TEMPERATURE: 97 F | OXYGEN SATURATION: 100 % | HEART RATE: 73 BPM | HEIGHT: 62 IN

## 2022-08-04 DIAGNOSIS — Z98.890 OTHER SPECIFIED POSTPROCEDURAL STATES: Chronic | ICD-10-CM

## 2022-08-04 DIAGNOSIS — Z01.818 ENCOUNTER FOR OTHER PREPROCEDURAL EXAMINATION: ICD-10-CM

## 2022-08-04 DIAGNOSIS — Z30.2 ENCOUNTER FOR STERILIZATION: ICD-10-CM

## 2022-08-04 DIAGNOSIS — Z90.49 ACQUIRED ABSENCE OF OTHER SPECIFIED PARTS OF DIGESTIVE TRACT: Chronic | ICD-10-CM

## 2022-08-04 LAB
ALBUMIN SERPL ELPH-MCNC: 4.6 G/DL — SIGNIFICANT CHANGE UP (ref 3.5–5.2)
ALP SERPL-CCNC: 100 U/L — SIGNIFICANT CHANGE UP (ref 30–115)
ALT FLD-CCNC: 44 U/L — HIGH (ref 0–41)
ANION GAP SERPL CALC-SCNC: 10 MMOL/L — SIGNIFICANT CHANGE UP (ref 7–14)
APTT BLD: 31.2 SEC — SIGNIFICANT CHANGE UP (ref 27–39.2)
AST SERPL-CCNC: 27 U/L — SIGNIFICANT CHANGE UP (ref 0–41)
BASOPHILS # BLD AUTO: 0.03 K/UL — SIGNIFICANT CHANGE UP (ref 0–0.2)
BASOPHILS NFR BLD AUTO: 0.5 % — SIGNIFICANT CHANGE UP (ref 0–1)
BILIRUB SERPL-MCNC: <0.2 MG/DL — SIGNIFICANT CHANGE UP (ref 0.2–1.2)
BUN SERPL-MCNC: 14 MG/DL — SIGNIFICANT CHANGE UP (ref 10–20)
CALCIUM SERPL-MCNC: 9.5 MG/DL — SIGNIFICANT CHANGE UP (ref 8.5–10.1)
CHLORIDE SERPL-SCNC: 105 MMOL/L — SIGNIFICANT CHANGE UP (ref 98–110)
CO2 SERPL-SCNC: 24 MMOL/L — SIGNIFICANT CHANGE UP (ref 17–32)
CREAT SERPL-MCNC: 0.6 MG/DL — LOW (ref 0.7–1.5)
EGFR: 122 ML/MIN/1.73M2 — SIGNIFICANT CHANGE UP
EOSINOPHIL # BLD AUTO: 0.28 K/UL — SIGNIFICANT CHANGE UP (ref 0–0.7)
EOSINOPHIL NFR BLD AUTO: 4.4 % — SIGNIFICANT CHANGE UP (ref 0–8)
GLUCOSE SERPL-MCNC: 86 MG/DL — SIGNIFICANT CHANGE UP (ref 70–99)
HCT VFR BLD CALC: 34.4 % — LOW (ref 37–47)
HGB BLD-MCNC: 10.9 G/DL — LOW (ref 12–16)
IMM GRANULOCYTES NFR BLD AUTO: 0.5 % — HIGH (ref 0.1–0.3)
INR BLD: 1.08 RATIO — SIGNIFICANT CHANGE UP (ref 0.65–1.3)
LYMPHOCYTES # BLD AUTO: 2.13 K/UL — SIGNIFICANT CHANGE UP (ref 1.2–3.4)
LYMPHOCYTES # BLD AUTO: 33.6 % — SIGNIFICANT CHANGE UP (ref 20.5–51.1)
MCHC RBC-ENTMCNC: 28.5 PG — SIGNIFICANT CHANGE UP (ref 27–31)
MCHC RBC-ENTMCNC: 31.7 G/DL — LOW (ref 32–37)
MCV RBC AUTO: 90.1 FL — SIGNIFICANT CHANGE UP (ref 81–99)
MONOCYTES # BLD AUTO: 0.46 K/UL — SIGNIFICANT CHANGE UP (ref 0.1–0.6)
MONOCYTES NFR BLD AUTO: 7.3 % — SIGNIFICANT CHANGE UP (ref 1.7–9.3)
NEUTROPHILS # BLD AUTO: 3.41 K/UL — SIGNIFICANT CHANGE UP (ref 1.4–6.5)
NEUTROPHILS NFR BLD AUTO: 53.7 % — SIGNIFICANT CHANGE UP (ref 42.2–75.2)
NRBC # BLD: 0 /100 WBCS — SIGNIFICANT CHANGE UP (ref 0–0)
PLATELET # BLD AUTO: 233 K/UL — SIGNIFICANT CHANGE UP (ref 130–400)
POTASSIUM SERPL-MCNC: 3.8 MMOL/L — SIGNIFICANT CHANGE UP (ref 3.5–5)
POTASSIUM SERPL-SCNC: 3.8 MMOL/L — SIGNIFICANT CHANGE UP (ref 3.5–5)
PROT SERPL-MCNC: 6.5 G/DL — SIGNIFICANT CHANGE UP (ref 6–8)
PROTHROM AB SERPL-ACNC: 12.4 SEC — SIGNIFICANT CHANGE UP (ref 9.95–12.87)
RBC # BLD: 3.82 M/UL — LOW (ref 4.2–5.4)
RBC # FLD: 13.3 % — SIGNIFICANT CHANGE UP (ref 11.5–14.5)
SODIUM SERPL-SCNC: 139 MMOL/L — SIGNIFICANT CHANGE UP (ref 135–146)
WBC # BLD: 6.34 K/UL — SIGNIFICANT CHANGE UP (ref 4.8–10.8)
WBC # FLD AUTO: 6.34 K/UL — SIGNIFICANT CHANGE UP (ref 4.8–10.8)

## 2022-08-04 NOTE — H&P PST ADULT - ATTENDING COMMENTS
pt seen and examined  reviewed plan for laparosocpic removal of fallopian tubes for sterilization  that this is permanent, cannot be reversed, should be 100% certain of decision, risk of future regret is possible, and that other alternative methods are available for effective contraception that are non-surgical  reviewed risks of bleeding, infection, damage to bowel and other organs during surgery  all questions answered, informed consent signed

## 2022-08-04 NOTE — H&P PST ADULT - HISTORY OF PRESENT ILLNESS
33 yo female presents for PAST in preparation for LAPAROSCOPIC BILATERAL SALPINGECTOMY  Pt states that she has four children ages 17/15/11/ and 3 months.  Last colonoscopy was in n/a  last mammogram was in n/a  last pap-smear was in 2022-wnl   LMP 7/16/22-regular  Denies any chest pain, difficulty breathing, SOB, palpitations, dysuria, URI, or any other infections in the last 2 weeks/1 month. Denies any recent travel, contact, or exposure to any persons with known or suspected COVID-19. Pt also denies COVID testing within the last 2 weeks. Pt advised to self quarantine until day of procedure. Exercise tolerance of 2-3 flights of stairs without dyspnea. SUE reviewed with patient.  Anesthesia Alert  NO--Difficult Airway  NO--History of neck surgery or radiation  NO--Limited ROM of neck  NO--History of Malignant hyperthermia  NO--Personal or family history of Pseudocholinesterase deficiency.  NO--Prior Anesthesia Complication  NO--Latex Allergy  NO--Loose teeth  NO--History of Rheumatoid Arthritis  NO--SUE  NO--Bleeding risk  NO--Other_____   written and verbal instructions with teach back on chlorhexidine shampoo provided,  pt verbalized understanding with returned demonstration  Patient verbalized understanding of instructions and was given the opportunity to ask questions and have them answered.

## 2022-08-04 NOTE — H&P PST ADULT - NSICDXPASTSURGICALHX_GEN_ALL_CORE_FT
PAST SURGICAL HISTORY:  Absence of gallbladder 7/2022  Liposuction done in [facility] 2020

## 2022-08-22 ENCOUNTER — LABORATORY RESULT (OUTPATIENT)
Age: 32
End: 2022-08-22

## 2022-08-24 NOTE — ASU PATIENT PROFILE, ADULT - NSICDXPASTSURGICALHX_GEN_ALL_CORE_FT
PAST SURGICAL HISTORY:  Absence of gallbladder 7/2022  Liposuction done in [facility] 2020

## 2022-08-25 ENCOUNTER — TRANSCRIPTION ENCOUNTER (OUTPATIENT)
Age: 32
End: 2022-08-25

## 2022-08-25 ENCOUNTER — RESULT REVIEW (OUTPATIENT)
Age: 32
End: 2022-08-25

## 2022-08-25 ENCOUNTER — OUTPATIENT (OUTPATIENT)
Dept: OUTPATIENT SERVICES | Facility: HOSPITAL | Age: 32
LOS: 1 days | Discharge: HOME | End: 2022-08-25

## 2022-08-25 VITALS
HEIGHT: 62 IN | WEIGHT: 179.9 LBS | RESPIRATION RATE: 18 BRPM | HEART RATE: 70 BPM | DIASTOLIC BLOOD PRESSURE: 50 MMHG | TEMPERATURE: 98 F | OXYGEN SATURATION: 99 % | SYSTOLIC BLOOD PRESSURE: 100 MMHG

## 2022-08-25 VITALS
OXYGEN SATURATION: 98 % | SYSTOLIC BLOOD PRESSURE: 114 MMHG | RESPIRATION RATE: 17 BRPM | HEART RATE: 72 BPM | DIASTOLIC BLOOD PRESSURE: 72 MMHG

## 2022-08-25 DIAGNOSIS — Z90.49 ACQUIRED ABSENCE OF OTHER SPECIFIED PARTS OF DIGESTIVE TRACT: Chronic | ICD-10-CM

## 2022-08-25 PROCEDURE — 58661 LAPAROSCOPY REMOVE ADNEXA: CPT

## 2022-08-25 PROCEDURE — 88302 TISSUE EXAM BY PATHOLOGIST: CPT | Mod: 26

## 2022-08-25 RX ORDER — HYDROMORPHONE HYDROCHLORIDE 2 MG/ML
0.5 INJECTION INTRAMUSCULAR; INTRAVENOUS; SUBCUTANEOUS
Refills: 0 | Status: DISCONTINUED | OUTPATIENT
Start: 2022-08-25 | End: 2022-08-25

## 2022-08-25 RX ORDER — SODIUM CHLORIDE 9 MG/ML
1000 INJECTION, SOLUTION INTRAVENOUS
Refills: 0 | Status: DISCONTINUED | OUTPATIENT
Start: 2022-08-25 | End: 2022-09-08

## 2022-08-25 RX ORDER — ONDANSETRON 8 MG/1
4 TABLET, FILM COATED ORAL ONCE
Refills: 0 | Status: DISCONTINUED | OUTPATIENT
Start: 2022-08-25 | End: 2022-09-08

## 2022-08-25 RX ADMIN — SODIUM CHLORIDE 100 MILLILITER(S): 9 INJECTION, SOLUTION INTRAVENOUS at 08:51

## 2022-08-25 NOTE — ASU DISCHARGE PLAN (ADULT/PEDIATRIC) - NS MD DC FALL RISK RISK
For information on Fall & Injury Prevention, visit: [email or web address] OR  [email or web address] OR  https://www.cdc.gov/steadi/patient.html
no

## 2022-08-25 NOTE — CHART NOTE - NSCHARTNOTEFT_GEN_A_CORE
PACU ANESTHESIA ADMISSION NOTE      Procedure: Salpingectomy, bilateral, total, laparoscopic      Post op diagnosis:  Request for sterilization        ____  Intubated  TV:______       Rate: ______      FiO2: ______    _x___  Patent Airway    _x___  Full return of protective reflexes    _x___  Full recovery from anesthesia / back to baseline status    Vitals:  T(C): 97.8F  HR: 77  BP: 110/57  RR: 21  SpO2: 100%    Mental Status:  _x___ Awake   _____ Alert   _____ Drowsy   _____ Sedated    Nausea/Vomiting:  _x___  NO       ______Yes,   See Post - Op Orders         Pain Scale (0-10):  __0___    Treatment: _x___ None    ____ See Post - Op/PCA Orders    Post - Operative Fluids:   __x__ Oral   ____ See Post - Op Orders    Plan: Discharge:   _x___Home       _____Floor     _____Critical Care    _____  Other:_________________    Comments:  No anesthesia issues or complications noted.  Discharge when criteria met.

## 2022-08-25 NOTE — ASU DISCHARGE PLAN (ADULT/PEDIATRIC) - CARE PROVIDER_API CALL
Gregory Ashby)  OBN  [address] [place] [address]  Phone: [phone]  Fax: [phone]  Follow Up Time: 2 weeks

## 2022-08-31 LAB — SURGICAL PATHOLOGY STUDY: SIGNIFICANT CHANGE UP

## 2022-09-02 DIAGNOSIS — Z90.49 ACQUIRED ABSENCE OF OTHER SPECIFIED PARTS OF DIGESTIVE TRACT: ICD-10-CM

## 2022-09-02 DIAGNOSIS — Z30.2 ENCOUNTER FOR STERILIZATION: ICD-10-CM

## 2023-03-09 NOTE — DISCHARGE NOTE OB - BREAST MILK SUPPORTS STABLE NEWBORN BLOOD SUGAR
How Severe Is Your Skin Lesion?: mild Have Your Skin Lesions Been Treated?: not been treated Is This A New Presentation, Or A Follow-Up?: Growth Statement Selected 9.39

## 2023-06-21 NOTE — ED ADULT TRIAGE NOTE - WEIGHT IN LBS
"  [address]        NAME: Héctor Barker is a 44 y o  female  : 1983    MRN: [id number]  DATE: 2023  TIME: 4:27 PM    Assessment and Plan   Acute cough [R05 1]  1  Acute cough  predniSONE 20 mg tablet            Patient Instructions       Follow up with PCP in 3-5 days  Proceed to  ER if symptoms worsen  Chief Complaint     Chief Complaint   Patient presents with   • Cough     Sick about a week ago, headcold, then eyes were like conjunctivitis, then a tightness in chest started, from dry airy cough         History of Present Illness       Patient is a 43 yo female who presents for evaluation of cough and nasal congestion x 1 week with chest tightness developing over the last couple of days  She denies SOB or difficulty breathing, wheezing, CP  No fevers  Review of Systems   Review of Systems   Constitutional: Negative for fever  Respiratory: Positive for cough and chest tightness  Negative for shortness of breath and wheezing  Cardiovascular: Negative for chest pain  Skin: Negative for color change  Current Medications       Current Outpatient Medications:   •  predniSONE 20 mg tablet, Take 1 tablet (20 mg total) by mouth daily for 5 days, Disp: 5 tablet, Rfl: 0  •  PROGESTERONE IM, TAKE ONE CAPSULE BY MOUTH ONCE NIGHTLY \"THIS IS A COMPOUNDED PREPARATION\", Disp: , Rfl:   •  Testosterone 30 MG/ACT SOLN, Place on the skin, Disp: , Rfl:     Current Allergies     Allergies as of 2023 - Reviewed 2023   Allergen Reaction Noted   • Tetanus-diphth-acell pertussis Anaphylaxis 2023            The following portions of the patient's history were reviewed and updated as appropriate: allergies, current medications, past family history, past medical history, past social history, past surgical history and problem list      No past medical history on file  No past surgical history on file  No family history on file        Medications have been " verified  Objective   /84   Pulse 90   Resp 16   SpO2 97%        Physical Exam     Physical Exam  Constitutional:       General: She is not in acute distress  Cardiovascular:      Rate and Rhythm: Normal rate  Heart sounds: Normal heart sounds  Pulmonary:      Comments: Not dyspneic or tachypnic  SPO2 Lungs CTA in all fields   Skin:     General: Skin is warm and dry  Neurological:      Mental Status: She is alert     Psychiatric:         Mood and Affect: Mood normal          Behavior: Behavior normal  169.9

## 2023-09-19 NOTE — DISCHARGE NOTE OB - FOUL SMELLING VAGINAL DISCHARGE
Pt called with questions about prep. States she started diarrhea after dulcolax, but before PEG. Pt states she wants to make sure that's ok as she feels slightly weak. Inst pt to increase fluids and make sure she's adding electrolytes. Inst to go to ER for increased weakness. All questions answered. Patient verbalized understanding.    
Statement Selected

## 2023-11-29 NOTE — OB PROVIDER TRIAGE NOTE - NSANTENATALSTERA_OBGYN_ALL_OB
Is This A New Presentation, Or A Follow-Up?: Growth How Severe Is Your Skin Lesion?: moderate Has Your Skin Lesion Been Treated?: not been treated No

## 2025-03-26 NOTE — ED ADULT TRIAGE NOTE - PRO INTERPRETER NEED 2
Called patient and advised ASV Rx was sent to [facility] and one of their representatives should reach out in 7-10 business days to schedule set up of the ASV machine in [place].     Scheduled compliance follow up 5/19/25.   
Notified office staff via [employer] secure chat to process ASV Rx.  
English